# Patient Record
Sex: FEMALE | Race: WHITE | NOT HISPANIC OR LATINO | Employment: UNEMPLOYED | URBAN - METROPOLITAN AREA
[De-identification: names, ages, dates, MRNs, and addresses within clinical notes are randomized per-mention and may not be internally consistent; named-entity substitution may affect disease eponyms.]

---

## 2020-03-10 ENCOUNTER — HOSPITAL ENCOUNTER (EMERGENCY)
Facility: HOSPITAL | Age: 40
Discharge: HOME | End: 2020-03-10
Attending: EMERGENCY MEDICINE
Payer: MEDICAID

## 2020-03-10 VITALS
HEIGHT: 71 IN | BODY MASS INDEX: 29.68 KG/M2 | OXYGEN SATURATION: 100 % | WEIGHT: 212 LBS | SYSTOLIC BLOOD PRESSURE: 135 MMHG | HEART RATE: 88 BPM | TEMPERATURE: 98 F | DIASTOLIC BLOOD PRESSURE: 88 MMHG | RESPIRATION RATE: 18 BRPM

## 2020-03-10 DIAGNOSIS — L02.91 ABSCESS: Primary | ICD-10-CM

## 2020-03-10 LAB
ALBUMIN SERPL BCP-MCNC: 4 G/DL (ref 3.5–5.2)
ALP SERPL-CCNC: 67 U/L (ref 55–135)
ALT SERPL W/O P-5'-P-CCNC: 20 U/L (ref 10–44)
ANION GAP SERPL CALC-SCNC: 7 MMOL/L (ref 8–16)
AST SERPL-CCNC: 17 U/L (ref 10–40)
BASOPHILS # BLD AUTO: 0.1 K/UL (ref 0–0.2)
BASOPHILS NFR BLD: 0.6 % (ref 0–1.9)
BILIRUB SERPL-MCNC: 0.9 MG/DL (ref 0.1–1)
BUN SERPL-MCNC: 23 MG/DL (ref 6–20)
CALCIUM SERPL-MCNC: 9.1 MG/DL (ref 8.7–10.5)
CHLORIDE SERPL-SCNC: 104 MMOL/L (ref 95–110)
CO2 SERPL-SCNC: 23 MMOL/L (ref 23–29)
CREAT SERPL-MCNC: 0.8 MG/DL (ref 0.5–1.4)
DIFFERENTIAL METHOD: ABNORMAL
EOSINOPHIL # BLD AUTO: 0.1 K/UL (ref 0–0.5)
EOSINOPHIL NFR BLD: 0.8 % (ref 0–8)
ERYTHROCYTE [DISTWIDTH] IN BLOOD BY AUTOMATED COUNT: 14.9 % (ref 11.5–14.5)
EST. GFR  (AFRICAN AMERICAN): >60 ML/MIN/1.73 M^2
EST. GFR  (NON AFRICAN AMERICAN): >60 ML/MIN/1.73 M^2
GLUCOSE SERPL-MCNC: 95 MG/DL (ref 70–110)
HCT VFR BLD AUTO: 37.1 % (ref 37–48.5)
HGB BLD-MCNC: 11.6 G/DL (ref 12–16)
IMM GRANULOCYTES # BLD AUTO: 0.12 K/UL (ref 0–0.04)
IMM GRANULOCYTES NFR BLD AUTO: 0.7 % (ref 0–0.5)
LYMPHOCYTES # BLD AUTO: 2.1 K/UL (ref 1–4.8)
LYMPHOCYTES NFR BLD: 12.9 % (ref 18–48)
MCH RBC QN AUTO: 30.1 PG (ref 27–31)
MCHC RBC AUTO-ENTMCNC: 31.3 G/DL (ref 32–36)
MCV RBC AUTO: 96 FL (ref 82–98)
MONOCYTES # BLD AUTO: 1.9 K/UL (ref 0.3–1)
MONOCYTES NFR BLD: 11.3 % (ref 4–15)
NEUTROPHILS # BLD AUTO: 12.2 K/UL (ref 1.8–7.7)
NEUTROPHILS NFR BLD: 73.7 % (ref 38–73)
NRBC BLD-RTO: 0 /100 WBC
PLATELET # BLD AUTO: 363 K/UL (ref 150–350)
PMV BLD AUTO: 11.3 FL (ref 9.2–12.9)
POTASSIUM SERPL-SCNC: 3.4 MMOL/L (ref 3.5–5.1)
PROT SERPL-MCNC: 7.9 G/DL (ref 6–8.4)
RBC # BLD AUTO: 3.86 M/UL (ref 4–5.4)
SODIUM SERPL-SCNC: 134 MMOL/L (ref 136–145)
WBC # BLD AUTO: 16.55 K/UL (ref 3.9–12.7)

## 2020-03-10 PROCEDURE — 99284 EMERGENCY DEPT VISIT MOD MDM: CPT | Mod: 25

## 2020-03-10 PROCEDURE — 96365 THER/PROPH/DIAG IV INF INIT: CPT

## 2020-03-10 PROCEDURE — S0077 INJECTION, CLINDAMYCIN PHOSP: HCPCS | Performed by: NURSE PRACTITIONER

## 2020-03-10 PROCEDURE — 80053 COMPREHEN METABOLIC PANEL: CPT

## 2020-03-10 PROCEDURE — 25000003 PHARM REV CODE 250: Performed by: NURSE PRACTITIONER

## 2020-03-10 PROCEDURE — 96361 HYDRATE IV INFUSION ADD-ON: CPT | Mod: 59

## 2020-03-10 PROCEDURE — 63600175 PHARM REV CODE 636 W HCPCS: Performed by: NURSE PRACTITIONER

## 2020-03-10 PROCEDURE — 85025 COMPLETE CBC W/AUTO DIFF WBC: CPT

## 2020-03-10 PROCEDURE — 10060 I&D ABSCESS SIMPLE/SINGLE: CPT | Mod: RT

## 2020-03-10 PROCEDURE — 36415 COLL VENOUS BLD VENIPUNCTURE: CPT

## 2020-03-10 PROCEDURE — 96366 THER/PROPH/DIAG IV INF ADDON: CPT

## 2020-03-10 RX ORDER — POTASSIUM CHLORIDE 20 MEQ/1
40 TABLET, EXTENDED RELEASE ORAL
Status: COMPLETED | OUTPATIENT
Start: 2020-03-10 | End: 2020-03-10

## 2020-03-10 RX ORDER — LIDOCAINE HYDROCHLORIDE 10 MG/ML
10 INJECTION, SOLUTION EPIDURAL; INFILTRATION; INTRACAUDAL; PERINEURAL
Status: COMPLETED | OUTPATIENT
Start: 2020-03-10 | End: 2020-03-10

## 2020-03-10 RX ORDER — CLINDAMYCIN PHOSPHATE 900 MG/50ML
900 INJECTION, SOLUTION INTRAVENOUS
Status: COMPLETED | OUTPATIENT
Start: 2020-03-10 | End: 2020-03-10

## 2020-03-10 RX ORDER — DICLOFENAC SODIUM 50 MG/1
50 TABLET, DELAYED RELEASE ORAL 3 TIMES DAILY PRN
Qty: 20 TABLET | Refills: 2 | Status: ON HOLD | OUTPATIENT
Start: 2020-03-10 | End: 2024-02-28 | Stop reason: HOSPADM

## 2020-03-10 RX ORDER — CLINDAMYCIN HYDROCHLORIDE 300 MG/1
300 CAPSULE ORAL 4 TIMES DAILY
Qty: 40 CAPSULE | Refills: 0 | Status: SHIPPED | OUTPATIENT
Start: 2020-03-10 | End: 2020-03-20

## 2020-03-10 RX ADMIN — CLINDAMYCIN PHOSPHATE 900 MG: 900 INJECTION, SOLUTION INTRAVENOUS at 05:03

## 2020-03-10 RX ADMIN — POTASSIUM CHLORIDE 40 MEQ: 20 TABLET, EXTENDED RELEASE ORAL at 07:03

## 2020-03-10 RX ADMIN — LIDOCAINE HYDROCHLORIDE 100 MG: 10 INJECTION, SOLUTION EPIDURAL; INFILTRATION; INTRACAUDAL; PERINEURAL at 05:03

## 2020-03-10 RX ADMIN — SODIUM CHLORIDE 1000 ML: 9 INJECTION, SOLUTION INTRAVENOUS at 05:03

## 2020-03-10 NOTE — ED PROVIDER NOTES
Encounter Date: 3/10/2020       History     Chief Complaint   Patient presents with    Abscess     RT FA, REDNESS, SENT FROM URGENT CARE, ONSET SAT, NEG SCREEN     Presents with abscess with surrounding cellulitis to right FA  Onset Friday         Review of patient's allergies indicates:  No Known Allergies  No past medical history on file.  No past surgical history on file.  No family history on file.  Social History     Tobacco Use    Smoking status: Not on file   Substance Use Topics    Alcohol use: Not on file    Drug use: Not on file     Review of Systems   Constitutional: Negative for fever.   Respiratory: Negative for cough, shortness of breath and wheezing.    Cardiovascular: Negative for chest pain, palpitations and leg swelling.   Gastrointestinal: Negative for abdominal pain, diarrhea, nausea and vomiting.   Genitourinary: Negative for dysuria.   Musculoskeletal: Negative for back pain.   Skin: Negative for rash.        abscess   Neurological: Negative for weakness.       Physical Exam     Initial Vitals [03/10/20 1432]   BP Pulse Resp Temp SpO2   (!) 154/72 99 19 98.9 °F (37.2 °C) 98 %      MAP       --         Physical Exam    Constitutional: She appears well-developed and well-nourished.   HENT:   Head: Normocephalic and atraumatic.   Eyes: Conjunctivae are normal.   Neck: Normal range of motion. Neck supple.   Cardiovascular: Normal rate and regular rhythm.   Pulmonary/Chest: Breath sounds normal. No respiratory distress.   Musculoskeletal: Normal range of motion.   Neurological: She is alert and oriented to person, place, and time. No sensory deficit. GCS score is 15. GCS eye subscore is 4. GCS verbal subscore is 5. GCS motor subscore is 6.   Skin: Skin is warm and dry.   Abscess to right FA with surrounding cellulitis   Psychiatric: She has a normal mood and affect. Thought content normal.         ED Course   I & D - Incision and Drainage  Date/Time: 3/10/2020 6:44 PM  Performed by: Raisa RUSSO  Back, NP  Authorized by: Sriram James MD   Consent Done: Not Needed  Type: abscess  Body area: upper extremity  Location details: right arm  Anesthesia: local infiltration    Anesthesia:  Local Anesthetic: lidocaine 1% without epinephrine  Anesthetic total: 10 mL  Patient sedated: no  Scalpel size: 11  Incision type: single straight  Complexity: simple  Drainage: serosanguinous  Drainage amount: scant  Wound treatment: incision  Packing material: 1/4 in gauze  Complications: No  Specimens: No  Implants: No  Patient tolerance: Patient tolerated the procedure well with no immediate complications        Labs Reviewed   CBC W/ AUTO DIFFERENTIAL - Abnormal; Notable for the following components:       Result Value    WBC 16.55 (*)     RBC 3.86 (*)     Hemoglobin 11.6 (*)     Mean Corpuscular Hemoglobin Conc 31.3 (*)     RDW 14.9 (*)     Platelets 363 (*)     Immature Granulocytes 0.7 (*)     Gran # (ANC) 12.2 (*)     Immature Grans (Abs) 0.12 (*)     Mono # 1.9 (*)     Gran% 73.7 (*)     Lymph% 12.9 (*)     All other components within normal limits   COMPREHENSIVE METABOLIC PANEL - Abnormal; Notable for the following components:    Sodium 134 (*)     Potassium 3.4 (*)     BUN, Bld 23 (*)     Anion Gap 7 (*)     All other components within normal limits          Imaging Results    None                       Attending Attestation:     Physician Attestation Statement for NP/PA:   I discussed this assessment and plan of this patient with the NP/PA, but I did not personally examine the patient. The face to face encounter was performed by the NP/PA.    Other NP/PA Attestation Additions:    History of Present Illness: I was not called upon to see this patient but was available for consultation and agree with the patient's disposition and management as it was presented to me by the APC.                                   Clinical Impression:       ICD-10-CM ICD-9-CM   1. Abscess L02.91 682.9                                 Raisa Back NP  03/10/20 6748       Sriram James MD  03/10/20 7744

## 2020-03-12 ENCOUNTER — HOSPITAL ENCOUNTER (EMERGENCY)
Facility: HOSPITAL | Age: 40
Discharge: HOME | End: 2020-03-13
Attending: EMERGENCY MEDICINE

## 2020-03-12 DIAGNOSIS — L02.91 ABSCESS: ICD-10-CM

## 2020-03-12 DIAGNOSIS — Z76.89 ENCOUNTER FOR INCISION AND DRAINAGE PROCEDURE: Primary | ICD-10-CM

## 2020-03-12 PROCEDURE — 99284 EMERGENCY DEPT VISIT MOD MDM: CPT

## 2020-03-12 RX ORDER — BUPROPION HYDROCHLORIDE 150 MG/1
150 TABLET ORAL DAILY
COMMUNITY

## 2020-03-12 RX ORDER — PRAMIPEXOLE DIHYDROCHLORIDE 0.25 MG/1
0.5 TABLET ORAL NIGHTLY
COMMUNITY

## 2020-03-12 RX ORDER — SERTRALINE HYDROCHLORIDE 100 MG/1
100 TABLET, FILM COATED ORAL DAILY
COMMUNITY

## 2020-03-12 RX ORDER — BENAZEPRIL HYDROCHLORIDE AND HYDROCHLOROTHIAZIDE 20; 12.5 MG/1; MG/1
1 TABLET ORAL DAILY
COMMUNITY

## 2020-03-13 VITALS
HEART RATE: 89 BPM | SYSTOLIC BLOOD PRESSURE: 144 MMHG | RESPIRATION RATE: 18 BRPM | HEIGHT: 72 IN | OXYGEN SATURATION: 98 % | TEMPERATURE: 99 F | BODY MASS INDEX: 29.66 KG/M2 | WEIGHT: 219 LBS | DIASTOLIC BLOOD PRESSURE: 82 MMHG

## 2020-03-13 PROCEDURE — 63600175 PHARM REV CODE 636 W HCPCS: Mod: SL | Performed by: STUDENT IN AN ORGANIZED HEALTH CARE EDUCATION/TRAINING PROGRAM

## 2020-03-13 PROCEDURE — 90714 TD VACC NO PRESV 7 YRS+ IM: CPT | Mod: SL | Performed by: STUDENT IN AN ORGANIZED HEALTH CARE EDUCATION/TRAINING PROGRAM

## 2020-03-13 PROCEDURE — 90471 IMMUNIZATION ADMIN: CPT | Mod: VFC | Performed by: STUDENT IN AN ORGANIZED HEALTH CARE EDUCATION/TRAINING PROGRAM

## 2020-03-13 PROCEDURE — 10060 I&D ABSCESS SIMPLE/SINGLE: CPT | Mod: XS

## 2020-03-13 PROCEDURE — 25000003 PHARM REV CODE 250: Performed by: EMERGENCY MEDICINE

## 2020-03-13 RX ORDER — LIDOCAINE AND PRILOCAINE 25; 25 MG/G; MG/G
CREAM TOPICAL ONCE
Status: COMPLETED | OUTPATIENT
Start: 2020-03-13 | End: 2020-03-13

## 2020-03-13 RX ORDER — DOXYCYCLINE 100 MG/1
100 CAPSULE ORAL 2 TIMES DAILY
Qty: 20 CAPSULE | Refills: 0 | Status: SHIPPED | OUTPATIENT
Start: 2020-03-13 | End: 2020-03-23

## 2020-03-13 RX ORDER — LIDOCAINE HYDROCHLORIDE AND EPINEPHRINE 10; 10 MG/ML; UG/ML
20 INJECTION, SOLUTION INFILTRATION; PERINEURAL ONCE
Status: COMPLETED | OUTPATIENT
Start: 2020-03-13 | End: 2020-03-13

## 2020-03-13 RX ADMIN — TETANUS AND DIPHTHERIA TOXOIDS ADSORBED 0.5 ML: 2; 2 INJECTION INTRAMUSCULAR at 03:03

## 2020-03-13 RX ADMIN — LIDOCAINE AND PRILOCAINE: 25; 25 CREAM TOPICAL at 02:03

## 2020-03-13 RX ADMIN — LIDOCAINE HYDROCHLORIDE AND EPINEPHRINE 20 ML: 10; 10 INJECTION, SOLUTION INFILTRATION; PERINEURAL at 02:03

## 2020-03-13 NOTE — ED PROVIDER NOTES
Encounter Date: 3/12/2020       History     Chief Complaint   Patient presents with    Abscess     c/o two abscesses to left buttock, began as small pimples 1 week ago, but she  picked and  squeezed them     HPI   Ms. Gil is a 40-year-old female with a past medical history of hypertension, polysubstance use, presenting to the emergency department for evaluation of an abscess to the right forearm, as well as left buttock, patient states that she recently had an incision and drainage to the right forearm 2 days ago, however appears to have reaccumulated, patient states the buttock abscess has been ongoing for the past several days.  Patient denies nausea vomiting, fever chills, shortness of breath or abdominal pain, or other infectious symptoms.  Patient states her last tetanus shot was in 2010.  Denies recent drug use in the upper extremity or buttock, states she has not used injectable drugs in over a year.    Review of patient's allergies indicates:  No Known Allergies  Past Medical History:   Diagnosis Date    Anxiety     Depression     Hypertension      Past Surgical History:   Procedure Laterality Date    CHOLECYSTECTOMY      TUBAL LIGATION      TYMPANOSTOMY TUBE PLACEMENT       History reviewed. No pertinent family history.  Social History     Tobacco Use    Smoking status: Never Smoker   Substance Use Topics    Alcohol use: Not on file    Drug use: Not on file     Review of Systems   Constitutional: Negative for chills, diaphoresis, fatigue and fever.   HENT: Negative for congestion.    Eyes: Negative for visual disturbance.   Respiratory: Negative for cough, shortness of breath, wheezing and stridor.    Cardiovascular: Negative for chest pain and palpitations.   Gastrointestinal: Negative for abdominal distention, diarrhea, nausea and vomiting.   Genitourinary: Negative for dysuria, frequency, hematuria and urgency.   Musculoskeletal: Negative for back pain.   Skin: Positive for wound. Negative  for pallor.   Neurological: Negative for weakness, light-headedness, numbness and headaches.   Psychiatric/Behavioral: Negative for confusion.   All other systems reviewed and are negative.      Physical Exam     Initial Vitals [20]   BP Pulse Resp Temp SpO2   (!) 157/80 108 20 98.8 °F (37.1 °C) 98 %      MAP       --         Physical Exam    Nursing note and vitals reviewed.  Constitutional: She appears well-developed and well-nourished. No distress.   HENT:   Head: Normocephalic and atraumatic.   Eyes: EOM are normal.   Neck: Normal range of motion. Neck supple.   Cardiovascular: Normal rate, regular rhythm, normal heart sounds and intact distal pulses.   Pulmonary/Chest: Breath sounds normal. She has no wheezes. She has no rhonchi. She has no rales.   Abdominal: Soft. Bowel sounds are normal. She exhibits no distension. There is no tenderness. There is no rebound.   Musculoskeletal: Normal range of motion.   Neurological: She is alert and oriented to person, place, and time. She has normal strength. No sensory deficit.   Skin: Skin is warm and dry. Capillary refill takes less than 2 seconds. Abscess (1 cm draining abscess to right forearm with mild amount of surrounding erythema, diffuse folliculitis to bilateral buttocks, 2 cm abscess to left buttocks with moderate surrounding cellulitis) noted.   Psychiatric: Thought content normal.         ED Course   I & D - Incision and Drainage  Date/Time: 3/13/2020 3:01 AM  Location procedure was performed: [facility] EMERGENCY DEPARTMENT  Performed by: Yoan Bermudez MD  Authorized by: Luzma Taylor MD   Pre-operative diagnosis: abscess  Post-operative diagnosis: abscess  Consent Done: Yes  Consent: Verbal consent obtained.  Risks and benefits: risks, benefits and alternatives were discussed  Consent given by: patient  Site marked: the operative site was marked  Patient identity confirmed: , name and verbally with patient  Time out:  "Immediately prior to procedure a "time out" was called to verify the correct patient, procedure, equipment, support staff and site/side marked as required.  Type: abscess  Body area: upper extremity  Location details: right arm  Anesthesia: local infiltration    Anesthesia:  Local anesthesia used: yes  Local Anesthetic: lidocaine 1% with epinephrine  Anesthetic total: 5 mL  Patient sedated: no  Description of findings: abscess   Scalpel size: 11  Incision type: single straight  Complexity: simple  Drainage: pus  Drainage amount: scant  Wound treatment: incision,  deloculation,  drainage,  expression of material and  wound packed  Packing material: / in gauze  Technical procedures used: incision  Significant surgical tasks conducted by the assistant(s):    Complications: No  Estimated blood loss (mL): 1  Specimens: No  Implants: No  Patient tolerance: Patient tolerated the procedure well with no immediate complications    I & D - Incision and Drainage  Date/Time: 3/13/2020 3:03 AM  Location procedure was performed: [facility] EMERGENCY DEPARTMENT  Performed by: Yoan Bermudez MD  Authorized by: Luzma Taylor MD   Pre-operative diagnosis: abscess  Post-operative diagnosis: abscess  Consent Done: Yes  Consent: Verbal consent obtained.  Consent given by: patient  Site marked: the operative site was marked  Patient identity confirmed: , name and verbally with patient  Time out: Immediately prior to procedure a "time out" was called to verify the correct patient, procedure, equipment, support staff and site/side marked as required.  Type: abscess  Location: buttock.  Anesthesia: local infiltration    Anesthesia:  Local anesthesia used: yes  Local Anesthetic: lidocaine 1% with epinephrine  Anesthetic total: 5 mL  Patient sedated: no  Description of findings: abscess   Scalpel size: 11  Incision type: single straight  Complexity: simple  Drainage: pus  Drainage amount: moderate  Wound treatment: " incision,  drainage,  expression of material,  wound packed and  deloculation  Packing material: 1/4 in gauze  Technical procedures used: incision  Significant surgical tasks conducted by the assistant(s):    Complications: No  Specimens: No  Patient tolerance: Patient tolerated the procedure well with no immediate complications        HO4  40-year-old female with a history of substance use, presenting to the emergency department for evaluation of an abscess to her right forearm does drink 2 days ago in reaccumulated, as well as an abscess to the left buttock, has been going on for several days, worsening, denies fever, chills, nausea, vomiting, or other systemic symptoms.  Patient has been taking clindamycin over the last 2 days following her incision and drainage, states that it is not improved the abscess to the buttock.  On physical exam as above.  A large abscess to the buttock on the left, as well as her right forearm, fluctuance noted, slight surrounding area of erythema, patient is afebrile with stable vitals here and not septic or ill appearing.  Incision drainage performed as above, tolerated well with no complications with moderate purulence of discharge, packed with iodoform gauze, patient given the instructions for cleaning or wounds, as well as removing the packing, patient to be switched from clindamycin to doxycycline, given tetanus here in the emergency department.  Patient is currently stable for discharge, will follow up, will return to the emergency department number concerning symptoms develop.  Yona Bermudez MD PGY4  03/13/2020 3:15 AM      Attending:  I saw and examined the patient.  I have reviewed and agree with the resident's findings, including all diagnostic interpretations and plans as written.  I was present for the key portions of the separately billed procedures.    Luzma Taylor MD  Emergency Medicine  03/13/2020 4:56 AM      Labs Reviewed   CULTURE, AEROBIC  (SPECIFY  SOURCE)          Imaging Results    None                                          Clinical Impression:       ICD-10-CM ICD-9-CM   1. Encounter for incision and drainage procedure Z01.89 V72.85   2. Abscess L02.91 682.9                                Yoan Bermudez MD  Resident  03/13/20 0318       Luzma Taylor MD  03/13/20 0402

## 2020-03-13 NOTE — ED NOTES
I/D done per ERP to both right forearm and left buttock. Link well. Packing and dressing placed by MD.

## 2020-04-09 ENCOUNTER — HOSPITAL ENCOUNTER (EMERGENCY)
Facility: HOSPITAL | Age: 40
Discharge: HOME | End: 2020-04-09
Attending: EMERGENCY MEDICINE
Payer: MEDICAID

## 2020-04-09 VITALS
SYSTOLIC BLOOD PRESSURE: 122 MMHG | WEIGHT: 216 LBS | DIASTOLIC BLOOD PRESSURE: 78 MMHG | TEMPERATURE: 98 F | HEIGHT: 71 IN | HEART RATE: 75 BPM | OXYGEN SATURATION: 99 % | RESPIRATION RATE: 16 BRPM | BODY MASS INDEX: 30.24 KG/M2

## 2020-04-09 DIAGNOSIS — S50.12XA CONTUSION OF LEFT FOREARM, INITIAL ENCOUNTER: Primary | ICD-10-CM

## 2020-04-09 DIAGNOSIS — S60.00XA CONTUSION OF FINGER WITHOUT DAMAGE TO NAIL, UNSPECIFIED FINGER, INITIAL ENCOUNTER: ICD-10-CM

## 2020-04-09 DIAGNOSIS — R52 PAIN: ICD-10-CM

## 2020-04-09 LAB
B-HCG UR QL: NEGATIVE
CTP QC/QA: YES

## 2020-04-09 PROCEDURE — 99284 EMERGENCY DEPT VISIT MOD MDM: CPT | Mod: 25

## 2020-04-09 PROCEDURE — 81025 URINE PREGNANCY TEST: CPT | Performed by: NURSE PRACTITIONER

## 2020-04-09 PROCEDURE — 25000003 PHARM REV CODE 250: Performed by: EMERGENCY MEDICINE

## 2020-04-09 RX ORDER — ACETAMINOPHEN 325 MG/1
650 TABLET ORAL
Status: COMPLETED | OUTPATIENT
Start: 2020-04-09 | End: 2020-04-09

## 2020-04-09 RX ORDER — DEXTROMETHORPHAN HYDROBROMIDE, GUAIFENESIN 5; 100 MG/5ML; MG/5ML
650 LIQUID ORAL EVERY 8 HOURS
Qty: 40 TABLET | Refills: 0 | Status: SHIPPED | OUTPATIENT
Start: 2020-04-09

## 2020-04-09 RX ADMIN — ACETAMINOPHEN 650 MG: 325 TABLET ORAL at 06:04
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
## 2020-04-10 NOTE — DISCHARGE INSTRUCTIONS
Please take your medications as prescribed.  If your symptoms worsen despite treatment please report back to the ER for repeat evaluation.  Your initial read of your x-rays did not show any obvious fractures.
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
## 2020-07-10 ENCOUNTER — HOSPITAL ENCOUNTER (INPATIENT)
Facility: HOSPITAL | Age: 40
LOS: 3 days | Discharge: HOME | DRG: 603 | End: 2020-07-13
Attending: EMERGENCY MEDICINE | Admitting: INTERNAL MEDICINE
Payer: MEDICAID

## 2020-07-10 DIAGNOSIS — R50.9 FEVER: ICD-10-CM

## 2020-07-10 DIAGNOSIS — L03.818 CELLULITIS OF OTHER SPECIFIED SITE: Primary | ICD-10-CM

## 2020-07-10 PROBLEM — L03.90 CELLULITIS: Status: ACTIVE | Noted: 2020-07-10

## 2020-07-10 PROBLEM — F19.90 IVDU (INTRAVENOUS DRUG USER): Status: ACTIVE | Noted: 2020-07-10

## 2020-07-10 PROBLEM — N39.0 UTI (URINARY TRACT INFECTION): Status: ACTIVE | Noted: 2020-07-10

## 2020-07-10 PROBLEM — D72.829 LEUKOCYTOSIS: Status: ACTIVE | Noted: 2020-07-10

## 2020-07-10 PROBLEM — F32.A DEPRESSION: Status: ACTIVE | Noted: 2020-07-10

## 2020-07-10 LAB
ALBUMIN SERPL BCP-MCNC: 3.6 G/DL (ref 3.5–5.2)
ALP SERPL-CCNC: 70 U/L (ref 55–135)
ALT SERPL W/O P-5'-P-CCNC: 18 U/L (ref 10–44)
ANION GAP SERPL CALC-SCNC: 9 MMOL/L (ref 8–16)
AST SERPL-CCNC: 15 U/L (ref 10–40)
B-HCG UR QL: NEGATIVE
BACTERIA #/AREA URNS HPF: ABNORMAL /HPF
BASOPHILS # BLD AUTO: 0.02 K/UL (ref 0–0.2)
BASOPHILS NFR BLD: 0.1 % (ref 0–1.9)
BILIRUB SERPL-MCNC: 0.6 MG/DL (ref 0.1–1)
BILIRUB UR QL STRIP: NEGATIVE
BUN SERPL-MCNC: 17 MG/DL (ref 6–20)
CALCIUM SERPL-MCNC: 9.1 MG/DL (ref 8.7–10.5)
CHLORIDE SERPL-SCNC: 101 MMOL/L (ref 95–110)
CLARITY UR: CLEAR
CO2 SERPL-SCNC: 24 MMOL/L (ref 23–29)
COLOR UR: YELLOW
CREAT SERPL-MCNC: 0.8 MG/DL (ref 0.5–1.4)
CRP SERPL-MCNC: 13.47 MG/DL
CTP QC/QA: YES
DIFFERENTIAL METHOD: ABNORMAL
EOSINOPHIL # BLD AUTO: 0.5 K/UL (ref 0–0.5)
EOSINOPHIL NFR BLD: 3.2 % (ref 0–8)
ERYTHROCYTE [DISTWIDTH] IN BLOOD BY AUTOMATED COUNT: 13.2 % (ref 11.5–14.5)
ERYTHROCYTE [SEDIMENTATION RATE] IN BLOOD BY WESTERGREN METHOD: 51 MM/HR (ref 0–20)
EST. GFR  (AFRICAN AMERICAN): >60 ML/MIN/1.73 M^2
EST. GFR  (NON AFRICAN AMERICAN): >60 ML/MIN/1.73 M^2
ESTIMATED AVG GLUCOSE: 111 MG/DL (ref 68–131)
FERRITIN SERPL-MCNC: 121 NG/ML (ref 20–300)
GLUCOSE SERPL-MCNC: 94 MG/DL (ref 70–110)
GLUCOSE UR QL STRIP: ABNORMAL
HBA1C MFR BLD HPLC: 5.5 % (ref 4.5–6.2)
HCT VFR BLD AUTO: 38.1 % (ref 37–48.5)
HGB BLD-MCNC: 12.4 G/DL (ref 12–16)
HGB UR QL STRIP: NEGATIVE
HYALINE CASTS #/AREA URNS LPF: 21 /LPF
IMM GRANULOCYTES # BLD AUTO: 0.08 K/UL (ref 0–0.04)
IMM GRANULOCYTES NFR BLD AUTO: 0.6 % (ref 0–0.5)
IRON SERPL-MCNC: 16 UG/DL (ref 30–160)
KETONES UR QL STRIP: ABNORMAL
LACTATE SERPL-SCNC: 1.1 MMOL/L (ref 0.5–1.9)
LEUKOCYTE ESTERASE UR QL STRIP: ABNORMAL
LYMPHOCYTES # BLD AUTO: 1 K/UL (ref 1–4.8)
LYMPHOCYTES NFR BLD: 6.9 % (ref 18–48)
MCH RBC QN AUTO: 30 PG (ref 27–31)
MCHC RBC AUTO-ENTMCNC: 32.5 G/DL (ref 32–36)
MCV RBC AUTO: 92 FL (ref 82–98)
MICROSCOPIC COMMENT: ABNORMAL
MONOCYTES # BLD AUTO: 0.9 K/UL (ref 0.3–1)
MONOCYTES NFR BLD: 5.9 % (ref 4–15)
NEUTROPHILS # BLD AUTO: 11.9 K/UL (ref 1.8–7.7)
NEUTROPHILS NFR BLD: 83.3 % (ref 38–73)
NITRITE UR QL STRIP: NEGATIVE
NRBC BLD-RTO: 0 /100 WBC
PH UR STRIP: 8 [PH] (ref 5–8)
PLATELET # BLD AUTO: 285 K/UL (ref 150–350)
PMV BLD AUTO: 11.7 FL (ref 9.2–12.9)
POTASSIUM SERPL-SCNC: 3.7 MMOL/L (ref 3.5–5.1)
PROCALCITONIN SERPL IA-MCNC: 0.17 NG/ML (ref 0–0.5)
PROT SERPL-MCNC: 7.4 G/DL (ref 6–8.4)
PROT UR QL STRIP: ABNORMAL
RBC # BLD AUTO: 4.13 M/UL (ref 4–5.4)
RBC #/AREA URNS HPF: 1 /HPF (ref 0–4)
SARS-COV-2 RDRP RESP QL NAA+PROBE: NEGATIVE
SATURATED IRON: 6 % (ref 20–50)
SODIUM SERPL-SCNC: 134 MMOL/L (ref 136–145)
SP GR UR STRIP: 1.03 (ref 1–1.03)
SQUAMOUS #/AREA URNS HPF: 12 /HPF
TOTAL IRON BINDING CAPACITY: 277 UG/DL (ref 250–450)
TRANSFERRIN SERPL-MCNC: 198 MG/DL (ref 200–375)
URN SPEC COLLECT METH UR: ABNORMAL
UROBILINOGEN UR STRIP-ACNC: NEGATIVE EU/DL
WBC # BLD AUTO: 14.32 K/UL (ref 3.9–12.7)
WBC #/AREA URNS HPF: 6 /HPF (ref 0–5)

## 2020-07-10 PROCEDURE — 81025 URINE PREGNANCY TEST: CPT | Performed by: EMERGENCY MEDICINE

## 2020-07-10 PROCEDURE — 63600175 PHARM REV CODE 636 W HCPCS: Performed by: EMERGENCY MEDICINE

## 2020-07-10 PROCEDURE — G0378 HOSPITAL OBSERVATION PER HR: HCPCS

## 2020-07-10 PROCEDURE — 25000003 PHARM REV CODE 250: Performed by: INTERNAL MEDICINE

## 2020-07-10 PROCEDURE — 80053 COMPREHEN METABOLIC PANEL: CPT

## 2020-07-10 PROCEDURE — 99900035 HC TECH TIME PER 15 MIN (STAT)

## 2020-07-10 PROCEDURE — 36415 COLL VENOUS BLD VENIPUNCTURE: CPT

## 2020-07-10 PROCEDURE — 85025 COMPLETE CBC W/AUTO DIFF WBC: CPT

## 2020-07-10 PROCEDURE — 25000003 PHARM REV CODE 250: Performed by: EMERGENCY MEDICINE

## 2020-07-10 PROCEDURE — 99285 EMERGENCY DEPT VISIT HI MDM: CPT | Mod: 25

## 2020-07-10 PROCEDURE — 82728 ASSAY OF FERRITIN: CPT

## 2020-07-10 PROCEDURE — 83540 ASSAY OF IRON: CPT

## 2020-07-10 PROCEDURE — 86140 C-REACTIVE PROTEIN: CPT

## 2020-07-10 PROCEDURE — 83605 ASSAY OF LACTIC ACID: CPT

## 2020-07-10 PROCEDURE — 83036 HEMOGLOBIN GLYCOSYLATED A1C: CPT

## 2020-07-10 PROCEDURE — 84145 PROCALCITONIN (PCT): CPT

## 2020-07-10 PROCEDURE — 87040 BLOOD CULTURE FOR BACTERIA: CPT

## 2020-07-10 PROCEDURE — 81001 URINALYSIS AUTO W/SCOPE: CPT

## 2020-07-10 PROCEDURE — 12000002 HC ACUTE/MED SURGE SEMI-PRIVATE ROOM

## 2020-07-10 PROCEDURE — 63600175 PHARM REV CODE 636 W HCPCS: Performed by: INTERNAL MEDICINE

## 2020-07-10 PROCEDURE — 96365 THER/PROPH/DIAG IV INF INIT: CPT

## 2020-07-10 PROCEDURE — 25000003 PHARM REV CODE 250: Performed by: NURSE PRACTITIONER

## 2020-07-10 PROCEDURE — 85651 RBC SED RATE NONAUTOMATED: CPT

## 2020-07-10 PROCEDURE — U0002 COVID-19 LAB TEST NON-CDC: HCPCS

## 2020-07-10 RX ORDER — DIPHENHYDRAMINE HYDROCHLORIDE 50 MG/ML
25 INJECTION INTRAMUSCULAR; INTRAVENOUS EVERY 6 HOURS PRN
Status: DISCONTINUED | OUTPATIENT
Start: 2020-07-10 | End: 2020-07-13 | Stop reason: HOSPADM

## 2020-07-10 RX ORDER — PRAMIPEXOLE DIHYDROCHLORIDE 0.12 MG/1
0.5 TABLET ORAL NIGHTLY
Status: DISCONTINUED | OUTPATIENT
Start: 2020-07-10 | End: 2020-07-13 | Stop reason: HOSPADM

## 2020-07-10 RX ORDER — VANCOMYCIN HCL IN 5 % DEXTROSE 1G/250ML
1000 PLASTIC BAG, INJECTION (ML) INTRAVENOUS ONCE
Status: COMPLETED | OUTPATIENT
Start: 2020-07-10 | End: 2020-07-10

## 2020-07-10 RX ORDER — ACETAMINOPHEN 500 MG
1000 TABLET ORAL
Status: COMPLETED | OUTPATIENT
Start: 2020-07-10 | End: 2020-07-10

## 2020-07-10 RX ORDER — BUPROPION HYDROCHLORIDE 150 MG/1
150 TABLET ORAL DAILY
Status: DISCONTINUED | OUTPATIENT
Start: 2020-07-10 | End: 2020-07-13 | Stop reason: HOSPADM

## 2020-07-10 RX ORDER — HYDROCHLOROTHIAZIDE 12.5 MG/1
12.5 TABLET ORAL DAILY
Status: DISCONTINUED | OUTPATIENT
Start: 2020-07-10 | End: 2020-07-13 | Stop reason: HOSPADM

## 2020-07-10 RX ORDER — MORPHINE SULFATE 2 MG/ML
1 INJECTION, SOLUTION INTRAMUSCULAR; INTRAVENOUS EVERY 6 HOURS PRN
Status: DISCONTINUED | OUTPATIENT
Start: 2020-07-10 | End: 2020-07-13 | Stop reason: HOSPADM

## 2020-07-10 RX ORDER — BENAZEPRIL HYDROCHLORIDE 20 MG/1
20 TABLET ORAL DAILY
Status: DISCONTINUED | OUTPATIENT
Start: 2020-07-10 | End: 2020-07-13 | Stop reason: HOSPADM

## 2020-07-10 RX ORDER — SODIUM CHLORIDE 9 MG/ML
INJECTION, SOLUTION INTRAVENOUS CONTINUOUS
Status: DISCONTINUED | OUTPATIENT
Start: 2020-07-10 | End: 2020-07-11

## 2020-07-10 RX ORDER — BENAZEPRIL HYDROCHLORIDE AND HYDROCHLOROTHIAZIDE 20; 12.5 MG/1; MG/1
1 TABLET ORAL DAILY
Status: DISCONTINUED | OUTPATIENT
Start: 2020-07-10 | End: 2020-07-10

## 2020-07-10 RX ORDER — CLINDAMYCIN PHOSPHATE 900 MG/50ML
900 INJECTION, SOLUTION INTRAVENOUS
Status: DISCONTINUED | OUTPATIENT
Start: 2020-07-10 | End: 2020-07-13 | Stop reason: HOSPADM

## 2020-07-10 RX ORDER — SERTRALINE HYDROCHLORIDE 50 MG/1
100 TABLET, FILM COATED ORAL DAILY
Status: DISCONTINUED | OUTPATIENT
Start: 2020-07-10 | End: 2020-07-13 | Stop reason: HOSPADM

## 2020-07-10 RX ORDER — SODIUM CHLORIDE 0.9 % (FLUSH) 0.9 %
10 SYRINGE (ML) INJECTION
Status: DISCONTINUED | OUTPATIENT
Start: 2020-07-10 | End: 2020-07-13 | Stop reason: HOSPADM

## 2020-07-10 RX ORDER — ACETAMINOPHEN 325 MG/1
650 TABLET ORAL EVERY 8 HOURS PRN
Status: DISCONTINUED | OUTPATIENT
Start: 2020-07-10 | End: 2020-07-13 | Stop reason: HOSPADM

## 2020-07-10 RX ORDER — HYDROCODONE BITARTRATE AND ACETAMINOPHEN 5; 325 MG/1; MG/1
1 TABLET ORAL EVERY 6 HOURS PRN
Status: DISCONTINUED | OUTPATIENT
Start: 2020-07-10 | End: 2020-07-13 | Stop reason: HOSPADM

## 2020-07-10 RX ADMIN — CLINDAMYCIN IN 5 PERCENT DEXTROSE 900 MG: 18 INJECTION, SOLUTION INTRAVENOUS at 11:07

## 2020-07-10 RX ADMIN — SODIUM CHLORIDE: 0.9 INJECTION, SOLUTION INTRAVENOUS at 09:07

## 2020-07-10 RX ADMIN — SERTRALINE HYDROCHLORIDE 100 MG: 50 TABLET ORAL at 09:07

## 2020-07-10 RX ADMIN — CLINDAMYCIN IN 5 PERCENT DEXTROSE 900 MG: 18 INJECTION, SOLUTION INTRAVENOUS at 03:07

## 2020-07-10 RX ADMIN — HYDROCHLOROTHIAZIDE 12.5 MG: 12.5 TABLET ORAL at 09:07

## 2020-07-10 RX ADMIN — VANCOMYCIN HYDROCHLORIDE 1000 MG: 1 INJECTION, POWDER, LYOPHILIZED, FOR SOLUTION INTRAVENOUS at 03:07

## 2020-07-10 RX ADMIN — BENAZEPRIL HYDROCHLORIDE 20 MG: 20 TABLET, COATED ORAL at 09:07

## 2020-07-10 RX ADMIN — ACETAMINOPHEN 1000 MG: 500 TABLET, FILM COATED ORAL at 03:07

## 2020-07-10 RX ADMIN — VANCOMYCIN HYDROCHLORIDE 500 MG: 500 INJECTION, POWDER, LYOPHILIZED, FOR SOLUTION INTRAVENOUS at 03:07

## 2020-07-10 RX ADMIN — PRAMIPEXOLE DIHYDROCHLORIDE 0.5 MG: 0.12 TABLET ORAL at 08:07

## 2020-07-10 RX ADMIN — SODIUM CHLORIDE, SODIUM LACTATE, POTASSIUM CHLORIDE, AND CALCIUM CHLORIDE 1000 ML: .6; .31; .03; .02 INJECTION, SOLUTION INTRAVENOUS at 02:07

## 2020-07-10 RX ADMIN — CEFTRIAXONE SODIUM 1 G: 1 INJECTION, POWDER, FOR SOLUTION INTRAMUSCULAR; INTRAVENOUS at 05:07

## 2020-07-10 RX ADMIN — BUPROPION HYDROCHLORIDE 150 MG: 150 TABLET, EXTENDED RELEASE ORAL at 09:07

## 2020-07-10 RX ADMIN — DIPHENHYDRAMINE HYDROCHLORIDE 25 MG: 50 INJECTION INTRAMUSCULAR; INTRAVENOUS at 11:07

## 2020-07-10 NOTE — PROGRESS NOTES
Pharmacokinetic Initial Assessment: IV Vancomycin    Assessment/Plan:    Initiate intravenous vancomycin with loading dose of 1500 mg once followed by a maintenance dose of vancomycin 1500 mg IV every 12 hours  Desired empiric serum trough concentration is 10 to 15 mcg/mL  Draw vancomycin trough level 30 min prior to fourth dose on 07/11 at approximately 1430  Pharmacy will continue to follow and monitor vancomycin.      Please contact pharmacy at extension [phone] with any questions regarding this assessment.     Thank you for the consult,   Harry Scott       Patient brief summary:  Hair Gil is a 40 y.o. female initiated on antimicrobial therapy with IV Vancomycin for treatment of suspected skin & soft tissue infection    Drug Allergies:   Review of patient's allergies indicates:  No Known Allergies    Actual Body Weight:   86.2 kg    Renal Function:   Estimated Creatinine Clearance: 111.4 mL/min (based on SCr of 0.8 mg/dL).,     CBC (last 72 hours):  Recent Labs   Lab Result Units 07/10/20  0223   WBC K/uL 14.32*   Hemoglobin g/dL 12.4   Hematocrit % 38.1   Platelets K/uL 285   Gran% % 83.3*   Lymph% % 6.9*   Mono% % 5.9   Eosinophil% % 3.2   Basophil% % 0.1   Differential Method  Automated       Metabolic Panel (last 72 hours):  Recent Labs   Lab Result Units 07/10/20  0223 07/10/20  0342   Sodium mmol/L 134*  --    Potassium mmol/L 3.7  --    Chloride mmol/L 101  --    CO2 mmol/L 24  --    Glucose mg/dL 94  --    Glucose, UA   --  Trace*   BUN, Bld mg/dL 17  --    Creatinine mg/dL 0.8  --    Albumin g/dL 3.6  --    Total Bilirubin mg/dL 0.6  --    Alkaline Phosphatase U/L 70  --    AST U/L 15  --    ALT U/L 18  --        Drug levels (last 3 results):  No results for input(s): VANCOMYCINRA, VANCOMYCINPE, VANCOMYCINTR in the last 72 hours.    Microbiologic Results:  Microbiology Results (last 7 days)       Procedure Component Value Units Date/Time    Blood culture #2 **CANNOT BE ORDERED STAT** [[id number]]  Collected: 07/10/20 0223    Order Status: Sent Specimen: Blood from Peripheral, Antecubital, Right Updated: 07/10/20 0234    Blood culture #1 **CANNOT BE ORDERED STAT** [[id number]] Collected: 07/10/20 0200    Order Status: Sent Specimen: Blood from Peripheral, Antecubital, Right Updated: 07/10/20 0233

## 2020-07-10 NOTE — PROGRESS NOTES
HOSPITALIST INTERVAL PROGRESS NOTE    The patient was admitted earlier today to the hospital medicine service for cellulitis requiring IV antibiotics.  Chart reviewed.  Case discussed with Nursing.  Patient was personally seen and examined.  She has cellulitis of mons pubis with extension into right labia majora with abscess status post I and D with minimal drainage.  She developed what appears to be red man syndrome from IV vancomycin with significant erythema of trunk and upper extremities including face and neck associated with pruritus that has improved some with IV Benadryl.  Will transition IV clindamycin.  She likely needs a couple more days of IV antibiotics prior to transition to oral antibiotics and discharge home.  No need for infectious disease consultation; I discussed this with Dr. Jiménez.  Continue pain control and antiemetics as needed.  Repeat a.m. labs.    Scott Hernández MD  [facility] Hospitalist

## 2020-07-10 NOTE — PLAN OF CARE
07/10/20 1603   Discharge Assessment   Assessment Type Discharge Planning Assessment   Confirmed/corrected address and phone number on facesheet? Yes   Assessment information obtained from? Patient   Expected Length of Stay (days) 3   Communicated expected length of stay with patient/caregiver yes   Prior to hospitilization cognitive status: Alert/Oriented   Prior to hospitalization functional status: Independent   Current cognitive status: Alert/Oriented   Current Functional Status: Independent   Facility Arrived From: home   Lives With child(checo), dependent   Able to Return to Prior Arrangements yes   Is patient able to care for self after discharge? Yes   Who are your caregiver(s) and their phone number(s)? jayden somers [phone]   Patient's perception of discharge disposition home or selfcare   Readmission Within the Last 30 Days no previous admission in last 30 days   Patient currently being followed by outpatient case management? No   Patient currently receives any other outside agency services? No   Equipment Currently Used at Home none   Do you have any problems affording any of your prescribed medications? No   Is the patient taking medications as prescribed? yes   Does the patient have transportation home? Yes   Transportation Anticipated car, drives self;family or friend will provide   Dialysis Name and Scheduled days n/a   Does the patient receive services at the [facility]? No   Discharge Plan A Home   Discharge Plan B Home with family   DME Needed Upon Discharge  none   Patient/Family in Agreement with Plan yes   Introduced self to patient asked if ok to take a few min of their time for short interview for D/C planning and ok with patient

## 2020-07-10 NOTE — H&P
"[facility] Medicine History & Physical Examination   Patient Name: Hair Gil  MRN: [id number]  Patient Class: OP- Observation   Admission Date: 7/10/2020  1:47 AM  Length of Stay: 0  Attending Physician:   Primary Care Provider: Primary Doctor No  Face-to-Face encounter date: 07/10/2020  Code Status: Full Code  MPOA:  Chief Complaint: Weakness (onset "this morning" / "weakness / not eating / feel dehydrated / body hurting' ) and Fever        Patient information was obtained from patient, past medical records and ER records.   HISTORY OF PRESENT ILLNESS:   Hair Gil is a 40 y.o. old  female who  has a past medical history of Anxiety, Depression, and Hypertension.. The patient presented to [facility] on 7/10/2020 with a primary complaint of Weakness (onset "this morning" / "weakness / not eating / feel dehydrated / body hurting' ) and Fever  .     40-year-old  female presents emergency room with weakness headaches and increased swelling to her suprapubic region.      This patient has a known history of hypertension IV drug abuse, depression and anxiety.      She presents today with fever and generalized body aches.  The patient is was seen in the emergency room last night for a large abscess to her mons pubis region on the right side.      This wound was I&D incision and drained and packed by the ER physician.  The patient was discharged home on Bactrim.  The patient states she took 2 doses of the Bactrim but noted increased swelling and pain to her right groin region so she came back to the emergency room for further evaluation.      She endorses subjective fever and chills and generalized body aches.  She describes her symptoms as moderate in severity with no alleviating or exacerbating factors.      She also complaints of some dysuria and decreased appetite.  She denies chest pain, shortness of breath, hematemesis hemoptysis lightheadedness dizziness or syncope.  " She did complain of headache that she describes her usual migraine headaches.      The patient has a known history of IV drug abuse and states she had last use meth on the 4th of July    For COVID-19 test was negative today  REVIEW OF SYSTEMS:   10 Point Review of System was performed and was found to be negative except for that mentioned already in the HPI and   Review of Systems (Negative unless checked off)  Review of Systems   Constitutional: Positive for chills and malaise/fatigue.   HENT: Negative.    Eyes: Negative.    Respiratory: Negative.    Cardiovascular: Negative.    Gastrointestinal: Negative.    Genitourinary: Negative.    Musculoskeletal: Positive for myalgias.   Skin:        Abscess to mons pubis   Neurological: Positive for headaches.   Endo/Heme/Allergies: Negative.    Psychiatric/Behavioral: Positive for depression and substance abuse.           PAST MEDICAL HISTORY:     Past Medical History:   Diagnosis Date    Anxiety     Depression     Hypertension        PAST SURGICAL HISTORY:     Past Surgical History:   Procedure Laterality Date    CHOLECYSTECTOMY      TUBAL LIGATION      TYMPANOSTOMY TUBE PLACEMENT         ALLERGIES:   Patient has no known allergies.    FAMILY HISTORY:   History reviewed. No pertinent family history.    SOCIAL HISTORY:     Social History     Tobacco Use    Smoking status: Never Smoker   Substance Use Topics    Alcohol use: Not on file        Social History     Substance and Sexual Activity   Sexual Activity Not on file        HOME MEDICATIONS:     Prior to Admission medications    Medication Sig Start Date End Date Taking? Authorizing Provider   acetaminophen (TYLENOL) 650 MG TbSR Take 1 tablet (650 mg total) by mouth every 8 (eight) hours. 4/9/20   Rajeev Boateng MD   benazepril-hydrochlorthiazide (LOTENSIN HCT) 20-12.5 mg per tablet Take 1 tablet by mouth once daily.    Historical Provider, MD   buPROPion (WELLBUTRIN XL) 150 MG TB24 tablet Take 150 mg by mouth  "once daily.    Historical Provider, MD   diclofenac (VOLTAREN) 50 MG EC tablet Take 1 tablet (50 mg total) by mouth 3 (three) times daily as needed. 3/10/20   Raisa Back NP   mupirocin (BACTROBAN) 2 % ointment Apply topically 3 (three) times daily. 7/9/20   Luzma Taylor MD   pramipexole (MIRAPEX) 0.25 MG tablet Take 0.5 mg by mouth every evening.    Historical Provider, MD   sertraline (ZOLOFT) 100 MG tablet Take 100 mg by mouth once daily.    Historical Provider, MD   sulfamethoxazole-trimethoprim 800-160mg (BACTRIM DS) 800-160 mg Tab Take 1 tablet by mouth 2 (two) times daily. for 10 days 7/9/20 7/19/20  Luzma Taylor MD         PHYSICAL EXAM:   BP (!) 118/57   Pulse 77   Temp 99.8 °F (37.7 °C) (Oral)   Resp 16   Ht 5' 11" (1.803 m)   Wt 86.2 kg (190 lb)   LMP 06/27/2020 (Within Days)   SpO2 100%   BMI 26.50 kg/m²   Vitals Reviewed  General appearance: Well-developed, well-nourished female in no apparent distress.  Skin: No Rash.   Neuro: Motor and sensory exams grossly intact. Good tone. Power in all 4 extremities 5/5.   HENT: Atraumatic head. Moist mucous membranes of oral cavity.  Eyes: Normal extraocular movements.   Neck: Supple. No evidence of lymphadenopathy. No thyroidomegaly.  Lungs: Clear to auscultation bilaterally. No wheezing present.   Heart: Regular rate and rhythm. S1 and S2 present with no murmurs/gallop/rub. No pedal edema. No JVD present.   Abdomen: Soft, non-distended, non-tender. No rebound tenderness/guarding. No masses or organomegaly. Bowel sounds are normal. Bladder is not palpable.   Extremities: No cyanosis, clubbing, or edema.  Psych/mental status: Alert and oriented. Cooperative. Responds appropriately to questions.     : cellulitis and redness to mons pubis with palpable right groin lymph nodes  EMERGENCY DEPARTMENT LABS AND IMAGING:   Following labs were Reviewed   Recent Labs   Lab 07/10/20  0223   WBC 14.32*   HGB 12.4   HCT 38.1      CALCIUM " 9.1   ALBUMIN 3.6   PROT 7.4   *   K 3.7   CO2 24      BUN 17   CREATININE 0.8   ALKPHOS 70   ALT 18   AST 15   BILITOT 0.6         BMP:   Recent Labs   Lab 07/10/20  0223   GLU 94   *   K 3.7      CO2 24   BUN 17   CREATININE 0.8   CALCIUM 9.1   , CMP   Recent Labs   Lab 07/10/20  0223   *   K 3.7      CO2 24   GLU 94   BUN 17   CREATININE 0.8   CALCIUM 9.1   PROT 7.4   ALBUMIN 3.6   BILITOT 0.6   ALKPHOS 70   AST 15   ALT 18   ANIONGAP 9   ESTGFRAFRICA >60.0   EGFRNONAA >60.0   , CBC   Recent Labs   Lab 07/10/20  0223   WBC 14.32*   HGB 12.4   HCT 38.1      , INR No results found for: INR, PROTIME, Lipid Panel No results found for: CHOL, HDL, LDLCALC, TRIG, CHOLHDL, Troponin No results for input(s): TROPONINI in the last 168 hours., A1C: No results for input(s): HGBA1C in the last 4320 hours. and All labs within the past 24 hours have been reviewed  Microbiology Results (last 7 days)     Procedure Component Value Units Date/Time    Blood culture #2 **CANNOT BE ORDERED STAT** [[id number]] Collected: 07/10/20 0223    Order Status: Sent Specimen: Blood from Peripheral, Antecubital, Right Updated: 07/10/20 0234    Blood culture #1 **CANNOT BE ORDERED STAT** [[id number]] Collected: 07/10/20 0200    Order Status: Sent Specimen: Blood from Peripheral, Antecubital, Right Updated: 07/10/20 0233        X-Ray Chest AP Portable    (Results Pending)     No results found.  ASSESSMENT & PLAN:   Hair Gil is a 40 y.o. female admitted for    1. Cellulitis to mons pubis region failed out-pt treatment  - Vanc and Zosyn for now  -ID consult  - CRP/ESR  -IV hydration  - Possible Gen Surgery consult    2.IVDA  - last use of IV drugs was 7/4/2020    3. UTI  - urine culture sent  - IV Rocephin    DVT Prophylaxis: will be placed on SCDs for DVT prophylaxis and will be advised to be as mobile as possible and sit in a chair as tolerated.    ________________________________________________________________________________    Discharge Planning and Disposition: No mobility needs. Ambulating well. Good social support system. Patient will be discharged in   Face-to-Face encounter date: 07/10/2020  Encounter included review of the medical records, interviewing and examining the patient face-to-face, discussion with family and other health care providers including emergency medicine physician, admission orders, interpreting lab/test results and formulating a plan of care.   Medical Decision Making during this encounter was  [_] Low Complexity  [_] Moderate Complexity  [x] High Complexity  _________________________________________________________________________________    INPATIENT LIST OF MEDICATIONS     Current Facility-Administered Medications:     0.9%  NaCl infusion, , Intravenous, Continuous, Holley Singleton NP    acetaminophen tablet 650 mg, 650 mg, Oral, Q8H PRN, Holley Singleton NP    benazepril-hydrochlorthiazide 20-12.5 mg per tablet 1 tablet, 1 tablet, Oral, Daily, Holley Singleton NP    buPROPion TB24 tablet 150 mg, 150 mg, Oral, Daily, Holley Singleton NP    HYDROcodone-acetaminophen 5-325 mg per tablet 1 tablet, 1 tablet, Oral, Q6H PRN, Holley Singleton NP    morphine injection 1 mg, 1 mg, Intravenous, Q6H PRN, Holley Singleton NP    pramipexole tablet 0.5 mg, 0.5 mg, Oral, QHS, Holley Singleton NP    promethazine (PHENERGAN) 12.5 mg in dextrose 5 % 50 mL IVPB, 12.5 mg, Intravenous, Q6H PRN, Holley Singleton NP    sertraline tablet 100 mg, 100 mg, Oral, Daily, Holley Singleton NP    sodium chloride 0.9% flush 10 mL, 10 mL, Intravenous, PRN, Holley Singleton NP    trazodone split tablet 25 mg, 25 mg, Oral, Nightly PRN, Holley Singleton NP    Pharmacy to dose Vancomycin consult, , , Once **AND** vancomycin - pharmacy to dose, , Intravenous, pharmacy to manage frequency, Luzma Taylor MD    Pharmacy to dose Vancomycin consult, , , Once **AND**  vancomycin - pharmacy to dose, , Intravenous, pharmacy to manage frequency, Holley Singleton, CODY    Current Outpatient Medications:     acetaminophen (TYLENOL) 650 MG TbSR, Take 1 tablet (650 mg total) by mouth every 8 (eight) hours., Disp: 40 tablet, Rfl: 0    benazepril-hydrochlorthiazide (LOTENSIN HCT) 20-12.5 mg per tablet, Take 1 tablet by mouth once daily., Disp: , Rfl:     buPROPion (WELLBUTRIN XL) 150 MG TB24 tablet, Take 150 mg by mouth once daily., Disp: , Rfl:     diclofenac (VOLTAREN) 50 MG EC tablet, Take 1 tablet (50 mg total) by mouth 3 (three) times daily as needed., Disp: 20 tablet, Rfl: 2    mupirocin (BACTROBAN) 2 % ointment, Apply topically 3 (three) times daily., Disp: 1 Tube, Rfl: 0    pramipexole (MIRAPEX) 0.25 MG tablet, Take 0.5 mg by mouth every evening., Disp: , Rfl:     sertraline (ZOLOFT) 100 MG tablet, Take 100 mg by mouth once daily., Disp: , Rfl:     sulfamethoxazole-trimethoprim 800-160mg (BACTRIM DS) 800-160 mg Tab, Take 1 tablet by mouth 2 (two) times daily. for 10 days, Disp: 20 tablet, Rfl: 0      Scheduled Meds:   benazepril-hydrochlorthiazide  1 tablet Oral Daily    buPROPion  150 mg Oral Daily    pramipexole  0.5 mg Oral QHS    sertraline  100 mg Oral Daily     Continuous Infusions:   sodium chloride 0.9%       PRN Meds:.acetaminophen, HYDROcodone-acetaminophen, morphine, promethazine (PHENERGAN) IVPB, sodium chloride 0.9%, trazodone, Pharmacy to dose Vancomycin consult **AND** vancomycin - pharmacy to dose, Pharmacy to dose Vancomycin consult **AND** vancomycin - pharmacy to dose      Holley Singleton  [facility] Hospitalist NP  07/10/2020

## 2020-07-10 NOTE — ED PROVIDER NOTES
"Encounter Date: 7/10/2020       History     Chief Complaint   Patient presents with    Weakness     onset "this morning" / "weakness / not eating / feel dehydrated / body hurting'     Fever     40-year-old female with a past medical history of hypertension, depression, anxiety and polysubstance abuse return to the emergency department with body aches and fever.  I saw this patient in the emergency department last night.  She presented with a large abscess with surrounding cellulitis to the right side of her mons pubis.  The abscess was drained successfully and packed.  Patient was discharged on Bactrim.  The patient states that she has taken 2 doses of Bactrim today.  She states that throughout the day she has had progressively worsening body aches and subjective fevers and chills.  She does not own a thermometer.  She states that she has had decreased appetite.  She has not urinated all day because she is unable to drink any fluids secondary to her symptoms.  She also has a mild, generalized throbbing headache that was not acute in nature and is not associated with any neck stiffness.  She denies any upper respiratory symptoms, cough or shortness of breath.  She denies any urinary symptoms.  No known sick contacts.        Review of patient's allergies indicates:  No Known Allergies  Past Medical History:   Diagnosis Date    Anxiety     Depression     Hypertension      Past Surgical History:   Procedure Laterality Date    CHOLECYSTECTOMY      TUBAL LIGATION      TYMPANOSTOMY TUBE PLACEMENT       No family history on file.  Social History     Tobacco Use    Smoking status: Never Smoker   Substance Use Topics    Alcohol use: Not on file    Drug use: Not on file     Review of Systems   Constitutional: Positive for appetite change, chills, fatigue and fever. Negative for diaphoresis.   HENT: Negative for congestion.    Eyes: Negative for visual disturbance.   Respiratory: Negative for cough and shortness of " breath.    Cardiovascular: Negative for chest pain and palpitations.   Gastrointestinal: Negative for abdominal distention, diarrhea, nausea and vomiting.   Genitourinary: Negative for dysuria and flank pain.   Musculoskeletal: Positive for myalgias. Negative for back pain, neck pain and neck stiffness.   Skin: Positive for wound. Negative for pallor.   Neurological: Positive for light-headedness and headaches. Negative for weakness and numbness.   Psychiatric/Behavioral: Negative for confusion.   All other systems reviewed and are negative.      Physical Exam     Initial Vitals [07/10/20 0144]   BP Pulse Resp Temp SpO2   123/70 90 16 99.8 °F (37.7 °C) 100 %      MAP       --         Physical Exam    Nursing note and vitals reviewed.  Constitutional: She appears well-developed and well-nourished.   Tearful, ill-appearing   HENT:   Head: Normocephalic and atraumatic.   Eyes: EOM are normal.   Neck: Normal range of motion. Neck supple.   No meningismus   Cardiovascular: Normal rate, regular rhythm, normal heart sounds and intact distal pulses.   No murmur heard.  Pulmonary/Chest: Breath sounds normal. She has no wheezes. She has no rhonchi. She has no rales.   Abdominal: Soft. There is no abdominal tenderness. There is no rebound and no guarding.   Musculoskeletal: Normal range of motion. No edema.   Lymphadenopathy:     She has no cervical adenopathy.   Neurological: She is alert and oriented to person, place, and time. She has normal strength. GCS score is 15. GCS eye subscore is 4. GCS verbal subscore is 5. GCS motor subscore is 6.   Skin: Skin is warm and dry. Capillary refill takes less than 2 seconds.   Right mons pubis with moderate increase in cellulitis but decreased area of induration and fluctuance, small amount of drainage from incision site, packing in place, mild right inguinal lymphadenopathy   Psychiatric: She has a normal mood and affect.         ED Course   Procedures  Labs Reviewed   CBC W/ AUTO  DIFFERENTIAL - Abnormal; Notable for the following components:       Result Value    WBC 14.32 (*)     Immature Granulocytes 0.6 (*)     Gran # (ANC) 11.9 (*)     Immature Grans (Abs) 0.08 (*)     Gran% 83.3 (*)     Lymph% 6.9 (*)     All other components within normal limits   COMPREHENSIVE METABOLIC PANEL - Abnormal; Notable for the following components:    Sodium 134 (*)     All other components within normal limits   URINALYSIS, REFLEX TO URINE CULTURE - Abnormal; Notable for the following components:    Protein, UA Trace (*)     Glucose, UA Trace (*)     Ketones, UA 1+ (*)     Leukocytes, UA Trace (*)     All other components within normal limits    Narrative:     Specimen Source->Urine   URINALYSIS MICROSCOPIC - Abnormal; Notable for the following components:    WBC, UA 6 (*)     Hyaline Casts, UA 21 (*)     All other components within normal limits    Narrative:     Specimen Source->Urine   CULTURE, BLOOD   CULTURE, BLOOD   LACTIC ACID, PLASMA   SARS-COV-2 RNA AMPLIFICATION, QUAL   POCT URINE PREGNANCY          Imaging Results          X-Ray Chest AP Portable (In process)                  Medical Decision Making:   ED Management:  40-year-old female returns emergency department with subjective fever and myalgias.  The patient's abscess has improved significantly but the cellulitis has worsened despite oral antibiotics.  She does have a leukocytosis.  Normal lactic acid.  No shock.  Urinalysis does have trace leukocytes and rare bacteria but the patient has no urinary symptoms.  Given her worsening symptoms she will be admitted for IV antibiotics until cultures result.    Luzma Taylor MD  Emergency Medicine  07/10/2020 4:14 AM                                   Clinical Impression:       ICD-10-CM ICD-9-CM   1. Cellulitis of other specified site  L03.818 682.8   2. Fever  R50.9 780.60                                Luzma Taylor MD  07/10/20 0415

## 2020-07-10 NOTE — PROGRESS NOTES
Therapy with Vancomycin complete and / or consult / order discontinued by Dr Hernández on 7- @ 14:45   Pharmacy will sign off, please re-consult as needed.  Thank you for allowing us to participate in this patient's care.  Regis Finnegan 7/10/2020 2:58 PM  Dept of Pharmacy  Ext [phone]

## 2020-07-10 NOTE — PLAN OF CARE
07/10/20 1526   Patient Assessment/Suction   Level of Consciousness (AVPU) alert   Respiratory Effort Normal;Unlabored   Expansion/Accessory Muscles/Retractions no use of accessory muscles   Rhythm/Pattern, Respiratory unlabored;pattern regular;depth regular   Cough Frequency no cough   PRE-TX-O2   O2 Device (Oxygen Therapy) room air   SpO2 99 %   Pulse 95   Resp 14   Respiratory Evaluation   $ Care Plan Tech Time 15 min

## 2020-07-11 LAB
ALBUMIN SERPL BCP-MCNC: 3.1 G/DL (ref 3.5–5.2)
ALP SERPL-CCNC: 65 U/L (ref 55–135)
ALT SERPL W/O P-5'-P-CCNC: 42 U/L (ref 10–44)
ANION GAP SERPL CALC-SCNC: 9 MMOL/L (ref 8–16)
AST SERPL-CCNC: 29 U/L (ref 10–40)
BASOPHILS # BLD AUTO: 0.02 K/UL (ref 0–0.2)
BASOPHILS NFR BLD: 0.3 % (ref 0–1.9)
BILIRUB SERPL-MCNC: 0.4 MG/DL (ref 0.1–1)
BUN SERPL-MCNC: 11 MG/DL (ref 6–20)
CALCIUM SERPL-MCNC: 8.7 MG/DL (ref 8.7–10.5)
CHLORIDE SERPL-SCNC: 103 MMOL/L (ref 95–110)
CO2 SERPL-SCNC: 28 MMOL/L (ref 23–29)
CREAT SERPL-MCNC: 0.8 MG/DL (ref 0.5–1.4)
DIFFERENTIAL METHOD: ABNORMAL
EOSINOPHIL # BLD AUTO: 0.4 K/UL (ref 0–0.5)
EOSINOPHIL NFR BLD: 6.3 % (ref 0–8)
ERYTHROCYTE [DISTWIDTH] IN BLOOD BY AUTOMATED COUNT: 13.2 % (ref 11.5–14.5)
EST. GFR  (AFRICAN AMERICAN): >60 ML/MIN/1.73 M^2
EST. GFR  (NON AFRICAN AMERICAN): >60 ML/MIN/1.73 M^2
GLUCOSE SERPL-MCNC: 104 MG/DL (ref 70–110)
HCT VFR BLD AUTO: 36 % (ref 37–48.5)
HGB BLD-MCNC: 11.4 G/DL (ref 12–16)
IMM GRANULOCYTES # BLD AUTO: 0.08 K/UL (ref 0–0.04)
IMM GRANULOCYTES NFR BLD AUTO: 1.2 % (ref 0–0.5)
LYMPHOCYTES # BLD AUTO: 1.6 K/UL (ref 1–4.8)
LYMPHOCYTES NFR BLD: 24.1 % (ref 18–48)
MAGNESIUM SERPL-MCNC: 2.1 MG/DL (ref 1.6–2.6)
MCH RBC QN AUTO: 29.8 PG (ref 27–31)
MCHC RBC AUTO-ENTMCNC: 31.7 G/DL (ref 32–36)
MCV RBC AUTO: 94 FL (ref 82–98)
MONOCYTES # BLD AUTO: 0.8 K/UL (ref 0.3–1)
MONOCYTES NFR BLD: 12.9 % (ref 4–15)
NEUTROPHILS # BLD AUTO: 3.6 K/UL (ref 1.8–7.7)
NEUTROPHILS NFR BLD: 55.2 % (ref 38–73)
NRBC BLD-RTO: 0 /100 WBC
PLATELET # BLD AUTO: 262 K/UL (ref 150–350)
PMV BLD AUTO: 11.2 FL (ref 9.2–12.9)
POTASSIUM SERPL-SCNC: 4.1 MMOL/L (ref 3.5–5.1)
PROT SERPL-MCNC: 6.4 G/DL (ref 6–8.4)
RBC # BLD AUTO: 3.82 M/UL (ref 4–5.4)
SODIUM SERPL-SCNC: 140 MMOL/L (ref 136–145)
WBC # BLD AUTO: 6.51 K/UL (ref 3.9–12.7)

## 2020-07-11 PROCEDURE — 36415 COLL VENOUS BLD VENIPUNCTURE: CPT

## 2020-07-11 PROCEDURE — 80053 COMPREHEN METABOLIC PANEL: CPT

## 2020-07-11 PROCEDURE — 12000002 HC ACUTE/MED SURGE SEMI-PRIVATE ROOM

## 2020-07-11 PROCEDURE — 25000003 PHARM REV CODE 250: Performed by: NURSE PRACTITIONER

## 2020-07-11 PROCEDURE — 83735 ASSAY OF MAGNESIUM: CPT

## 2020-07-11 PROCEDURE — G0378 HOSPITAL OBSERVATION PER HR: HCPCS

## 2020-07-11 PROCEDURE — 25000003 PHARM REV CODE 250: Performed by: INTERNAL MEDICINE

## 2020-07-11 PROCEDURE — 85025 COMPLETE CBC W/AUTO DIFF WBC: CPT

## 2020-07-11 RX ADMIN — BENAZEPRIL HYDROCHLORIDE 20 MG: 20 TABLET, COATED ORAL at 08:07

## 2020-07-11 RX ADMIN — BUPROPION HYDROCHLORIDE 150 MG: 150 TABLET, EXTENDED RELEASE ORAL at 08:07

## 2020-07-11 RX ADMIN — CLINDAMYCIN IN 5 PERCENT DEXTROSE 900 MG: 18 INJECTION, SOLUTION INTRAVENOUS at 03:07

## 2020-07-11 RX ADMIN — PRAMIPEXOLE DIHYDROCHLORIDE 0.5 MG: 0.12 TABLET ORAL at 08:07

## 2020-07-11 RX ADMIN — CLINDAMYCIN IN 5 PERCENT DEXTROSE 900 MG: 18 INJECTION, SOLUTION INTRAVENOUS at 11:07

## 2020-07-11 RX ADMIN — HYDROCHLOROTHIAZIDE 12.5 MG: 12.5 TABLET ORAL at 08:07

## 2020-07-11 RX ADMIN — CLINDAMYCIN IN 5 PERCENT DEXTROSE 900 MG: 18 INJECTION, SOLUTION INTRAVENOUS at 08:07

## 2020-07-11 RX ADMIN — SERTRALINE HYDROCHLORIDE 100 MG: 50 TABLET ORAL at 08:07

## 2020-07-11 NOTE — PLAN OF CARE
Problem: Adult Inpatient Plan of Care  Goal: Plan of Care Review  Outcome: Ongoing, Progressing  Goal: Patient-Specific Goal (Individualization)  Outcome: Ongoing, Progressing  Goal: Absence of Hospital-Acquired Illness or Injury  Outcome: Ongoing, Progressing  Goal: Optimal Comfort and Wellbeing  Outcome: Ongoing, Progressing  Goal: Readiness for Transition of Care  Outcome: Ongoing, Progressing  Goal: Rounds/Family Conference  Outcome: Ongoing, Progressing     Problem: Fall Injury Risk  Goal: Absence of Fall and Fall-Related Injury  Outcome: Ongoing, Progressing     Problem: Wound  Goal: Optimal Wound Healing  Outcome: Ongoing, Progressing     Problem: Infection  Goal: Infection Symptom Resolution  Outcome: Ongoing, Progressing

## 2020-07-11 NOTE — PROGRESS NOTES
"[facility] Medicine  Progress Note  Patient Name: Hair Gil  MRN: [id number]  Patient Class: OP- Observation   Admission Date: 7/10/2020  1:47 AM   Attending Physician:  Scott Hernández MD  Primary Care Provider: Primary Doctor No  Face-to-Face encounter date: 07/11/2020     HISTORY OF PRESENT ILLNESS:   Hair Gil is a 40 y.o. old  female who  has a past medical history of Anxiety, Depression, and Hypertension.. The patient presented to [facility] on 7/10/2020 with a primary complaint of Weakness (onset "this morning" / "weakness / not eating / feel dehydrated / body hurting' ) and Fever  40-year-old  female presents emergency room with weakness headaches and increased swelling to her suprapubic region.    This patient has a known history of hypertension IV drug abuse, depression and anxiety.    She presents today with fever and generalized body aches.  The patient is was seen in the emergency room last night for a large abscess to her mons pubis region on the right side.   This wound was I&D incision and drained and packed by the ER physician.  The patient was discharged home on Bactrim.  The patient states she took 2 doses of the Bactrim but noted increased swelling and pain to her right groin region so she came back to the emergency room for further evaluation.    She endorses subjective fever and chills and generalized body aches.  She describes her symptoms as moderate in severity with no alleviating or exacerbating factors.    She also complaints of some dysuria and decreased appetite.  She denies chest pain, shortness of breath, hematemesis hemoptysis lightheadedness dizziness or syncope.  She did complain of headache that she describes her usual migraine headaches.    The patient has a known history of IV drug abuse and states she had last use meth on the 4th of July  For COVID-19 test was negative today    Interval history:     Today the patient reports " persistent but improving redness and swelling to mons pubis, constant timing, moderate intensity, slowly improving with medical treatment.  She reports persistent drainage from site of I&D.  No fever or chills.  No abdominal pain, nausea, vomiting, shortness of breath, or chest pain.  She is mobilizing.  She is eating well.  She reports persistent redness and swelling of trunk and extremities although it is slowly improving.    PHYSICAL EXAM:     Vitals Reviewed  General appearance:  Comfortable appearing, nontoxic, no apparent distress  Skin:  Dry and warm no jaundice, persistent redness and edema of trunk and bilateral arms which has improved some from yesterday  Neuro:  Nonfocal motor exam, alert and oriented, fluent speech  HENT:  Moist mucous membranes, no thrush  Eyes:  Anicteric sclerae, no conjunctival discharge, PERRLA  Lungs: Clear to auscultation bilaterally.  Comfortable work of breathing  Heart: Regular rate and rhythm.  2+ radial pulses, regular rate and rhythm.    Abdomen: Soft, non-distended, non-tender.  Psych:  Mood is calm, affect normal, insight good  : cellulitis and redness to mons pubis extending into superior right labia majora with status post I&D site with minimal purulent drainage, persistent induration slightly improved from yesterday, no palpable fluctuance    LABS AND IMAGING:   Following labs were Reviewed   Recent Labs   Lab 07/11/20  0640   WBC 6.51   HGB 11.4*   HCT 36.0*      CALCIUM 8.7   ALBUMIN 3.1*   PROT 6.4      K 4.1   CO2 28      BUN 11   CREATININE 0.8   ALKPHOS 65   ALT 42   AST 29   BILITOT 0.4         BMP:   Recent Labs   Lab 07/10/20  0223 07/11/20  0640   GLU 94 104   * 140   K 3.7 4.1    103   CO2 24 28   BUN 17 11   CREATININE 0.8 0.8   CALCIUM 9.1 8.7   MG  --  2.1   , CMP   Recent Labs   Lab 07/10/20  0223 07/11/20  0640   * 140   K 3.7 4.1    103   CO2 24 28   GLU 94 104   BUN 17 11   CREATININE 0.8 0.8   CALCIUM 9.1  8.7   PROT 7.4 6.4   ALBUMIN 3.6 3.1*   BILITOT 0.6 0.4   ALKPHOS 70 65   AST 15 29   ALT 18 42   ANIONGAP 9 9   ESTGFRAFRICA >60.0 >60.0   EGFRNONAA >60.0 >60.0   , CBC   Recent Labs   Lab 07/10/20  0223 07/11/20  0640   WBC 14.32* 6.51   HGB 12.4 11.4*   HCT 38.1 36.0*    262   , INR No results found for: INR, PROTIME, Lipid Panel No results found for: CHOL, HDL, LDLCALC, TRIG, CHOLHDL, Troponin No results for input(s): TROPONINI in the last 168 hours., A1C:   Recent Labs   Lab 07/10/20  0623   HGBA1C 5.5    and All labs within the past 24 hours have been reviewed  Microbiology Results (last 7 days)     Procedure Component Value Units Date/Time    Blood culture #2 **CANNOT BE ORDERED STAT** [[id number]] Collected: 07/10/20 0223    Order Status: Completed Specimen: Blood from Peripheral, Antecubital, Right Updated: 07/11/20 0432     Blood Culture, Routine No Growth to date      No Growth to date    Blood culture #1 **CANNOT BE ORDERED STAT** [[id number]] Collected: 07/10/20 0200    Order Status: Completed Specimen: Blood from Peripheral, Antecubital, Right Updated: 07/11/20 0432     Blood Culture, Routine No Growth to date      No Growth to date        X-Ray Chest AP Portable   Final Result        No results found.  ASSESSMENT & PLAN:   Hair Gil is a 40 y.o. female admitted for    Cellulitis to mons pubis region failed out-pt treatment  Allergic reaction likely red man syndrome to vancomycin  - Vanc and Zosyn for now  -ID consult  - CRP/ESR  -IV hydration  - Possible Gen Surgery consult    IVDA  - last use of IV drugs was 7/4/2020    Anxiety  Hypertension  Hyperlipidemia    Plan update today:  Continue care on Med surg.  The patient is slowly improving.  Continue IV clindamycin.  Serial examination of infection site.  Continue to monitor improving redness and edema of trunk and extremities after adverse reaction yesterday.  IV Benadryl as needed for pruritus.  Local wound care  Supportive care including  pain control and antiemetics as needed  The patient is tolerating diet well.  Discontinue IV fluids.  Serial labs.  Mobilize as able.  VTE prophylaxis with SCDs  Moderate risk secondary to moderate intensity illness; prescription drug management  Discharge likely another 48-72 hours.    Scott Hernández  [facility] Hospitalist  07/11/2020

## 2020-07-11 NOTE — NURSING
Dsg right pubis falling off.  New one reapplied.  Packing left in place.  Min amount pink drainage noted.

## 2020-07-11 NOTE — PLAN OF CARE
Problem: Adult Inpatient Plan of Care  Goal: Plan of Care Review  Outcome: Ongoing, Progressing  Goal: Optimal Comfort and Wellbeing  Outcome: Ongoing, Progressing     Problem: Fall Injury Risk  Goal: Absence of Fall and Fall-Related Injury  Outcome: Ongoing, Progressing     Problem: Wound  Goal: Optimal Wound Healing  Outcome: Ongoing, Progressing     Problem: Infection  Goal: Infection Symptom Resolution  Outcome: Ongoing, Progressing

## 2020-07-11 NOTE — NURSING
Pt admitted with splotchy redness to skin and moderate swelling to fingers. At 1115 pt c/o itching and states that she thinks she may be allergic to one of the antibiotics she received in OR.  Pt has no other symptoms of anaphalaxis.  Dr Hernández notifed, orders recived for benedryl. After benedryl administered pt states itching is gone, splotchy redness and finger swelling remains.  No further issues.

## 2020-07-12 LAB
ALBUMIN SERPL BCP-MCNC: 3.3 G/DL (ref 3.5–5.2)
ALP SERPL-CCNC: 67 U/L (ref 55–135)
ALT SERPL W/O P-5'-P-CCNC: 42 U/L (ref 10–44)
ANION GAP SERPL CALC-SCNC: 7 MMOL/L (ref 8–16)
AST SERPL-CCNC: 21 U/L (ref 10–40)
BASOPHILS # BLD AUTO: 0.04 K/UL (ref 0–0.2)
BASOPHILS NFR BLD: 0.6 % (ref 0–1.9)
BILIRUB SERPL-MCNC: 0.4 MG/DL (ref 0.1–1)
BUN SERPL-MCNC: 13 MG/DL (ref 6–20)
CALCIUM SERPL-MCNC: 9 MG/DL (ref 8.7–10.5)
CHLORIDE SERPL-SCNC: 103 MMOL/L (ref 95–110)
CO2 SERPL-SCNC: 29 MMOL/L (ref 23–29)
CREAT SERPL-MCNC: 0.7 MG/DL (ref 0.5–1.4)
DIFFERENTIAL METHOD: ABNORMAL
EOSINOPHIL # BLD AUTO: 0.4 K/UL (ref 0–0.5)
EOSINOPHIL NFR BLD: 6.1 % (ref 0–8)
ERYTHROCYTE [DISTWIDTH] IN BLOOD BY AUTOMATED COUNT: 13.2 % (ref 11.5–14.5)
EST. GFR  (AFRICAN AMERICAN): >60 ML/MIN/1.73 M^2
EST. GFR  (NON AFRICAN AMERICAN): >60 ML/MIN/1.73 M^2
GLUCOSE SERPL-MCNC: 97 MG/DL (ref 70–110)
HCT VFR BLD AUTO: 38.4 % (ref 37–48.5)
HGB BLD-MCNC: 12.2 G/DL (ref 12–16)
IMM GRANULOCYTES # BLD AUTO: 0.19 K/UL (ref 0–0.04)
IMM GRANULOCYTES NFR BLD AUTO: 2.7 % (ref 0–0.5)
LYMPHOCYTES # BLD AUTO: 2.2 K/UL (ref 1–4.8)
LYMPHOCYTES NFR BLD: 31.3 % (ref 18–48)
MAGNESIUM SERPL-MCNC: 2.1 MG/DL (ref 1.6–2.6)
MCH RBC QN AUTO: 29.6 PG (ref 27–31)
MCHC RBC AUTO-ENTMCNC: 31.8 G/DL (ref 32–36)
MCV RBC AUTO: 93 FL (ref 82–98)
MONOCYTES # BLD AUTO: 1 K/UL (ref 0.3–1)
MONOCYTES NFR BLD: 13.5 % (ref 4–15)
NEUTROPHILS # BLD AUTO: 3.2 K/UL (ref 1.8–7.7)
NEUTROPHILS NFR BLD: 45.8 % (ref 38–73)
NRBC BLD-RTO: 0 /100 WBC
PLATELET # BLD AUTO: 318 K/UL (ref 150–350)
PMV BLD AUTO: 11.1 FL (ref 9.2–12.9)
POTASSIUM SERPL-SCNC: 4.9 MMOL/L (ref 3.5–5.1)
PROT SERPL-MCNC: 7.2 G/DL (ref 6–8.4)
RBC # BLD AUTO: 4.12 M/UL (ref 4–5.4)
SODIUM SERPL-SCNC: 139 MMOL/L (ref 136–145)
WBC # BLD AUTO: 7.05 K/UL (ref 3.9–12.7)

## 2020-07-12 PROCEDURE — 25000003 PHARM REV CODE 250: Performed by: INTERNAL MEDICINE

## 2020-07-12 PROCEDURE — 12000002 HC ACUTE/MED SURGE SEMI-PRIVATE ROOM

## 2020-07-12 PROCEDURE — 80053 COMPREHEN METABOLIC PANEL: CPT

## 2020-07-12 PROCEDURE — 85025 COMPLETE CBC W/AUTO DIFF WBC: CPT

## 2020-07-12 PROCEDURE — 83735 ASSAY OF MAGNESIUM: CPT

## 2020-07-12 PROCEDURE — 25000003 PHARM REV CODE 250: Performed by: NURSE PRACTITIONER

## 2020-07-12 PROCEDURE — 36415 COLL VENOUS BLD VENIPUNCTURE: CPT

## 2020-07-12 RX ADMIN — BENAZEPRIL HYDROCHLORIDE 20 MG: 20 TABLET, COATED ORAL at 08:07

## 2020-07-12 RX ADMIN — CLINDAMYCIN IN 5 PERCENT DEXTROSE 900 MG: 18 INJECTION, SOLUTION INTRAVENOUS at 11:07

## 2020-07-12 RX ADMIN — CLINDAMYCIN IN 5 PERCENT DEXTROSE 900 MG: 18 INJECTION, SOLUTION INTRAVENOUS at 08:07

## 2020-07-12 RX ADMIN — HYDROCHLOROTHIAZIDE 12.5 MG: 12.5 TABLET ORAL at 08:07

## 2020-07-12 RX ADMIN — CLINDAMYCIN IN 5 PERCENT DEXTROSE 900 MG: 18 INJECTION, SOLUTION INTRAVENOUS at 03:07

## 2020-07-12 RX ADMIN — BUPROPION HYDROCHLORIDE 150 MG: 150 TABLET, EXTENDED RELEASE ORAL at 08:07

## 2020-07-12 RX ADMIN — PRAMIPEXOLE DIHYDROCHLORIDE 0.5 MG: 0.12 TABLET ORAL at 08:07

## 2020-07-12 RX ADMIN — SERTRALINE HYDROCHLORIDE 100 MG: 50 TABLET ORAL at 08:07

## 2020-07-12 NOTE — PLAN OF CARE
Problem: Adult Inpatient Plan of Care  Goal: Plan of Care Review  Outcome: Ongoing, Progressing  Goal: Absence of Hospital-Acquired Illness or Injury  Outcome: Ongoing, Progressing  Goal: Optimal Comfort and Wellbeing  Outcome: Ongoing, Progressing     Problem: Fall Injury Risk  Goal: Absence of Fall and Fall-Related Injury  Outcome: Ongoing, Progressing     Problem: Wound  Goal: Optimal Wound Healing  Outcome: Ongoing, Progressing     Problem: Infection  Goal: Infection Symptom Resolution  Outcome: Ongoing, Progressing   Continue iv abx as ordered.

## 2020-07-12 NOTE — PROGRESS NOTES
"[facility] Medicine  Progress Note  Patient Name: Hair Gil  MRN: [id number]  Patient Class: IP- Inpatient   Admission Date: 7/10/2020  1:47 AM   Attending Physician:  Scott Hernández MD  Primary Care Provider: Primary Doctor No  Face-to-Face encounter date: 07/12/2020     HISTORY OF PRESENT ILLNESS:   Hair Gil is a 40 y.o. old  female who  has a past medical history of Anxiety, Depression, and Hypertension.. The patient presented to [facility] on 7/10/2020 with a primary complaint of Weakness (onset "this morning" / "weakness / not eating / feel dehydrated / body hurting' ) and Fever  40-year-old  female presents emergency room with weakness headaches and increased swelling to her suprapubic region.    This patient has a known history of hypertension IV drug abuse, depression and anxiety.    She presents today with fever and generalized body aches.  The patient is was seen in the emergency room last night for a large abscess to her mons pubis region on the right side.   This wound was I&D incision and drained and packed by the ER physician.  The patient was discharged home on Bactrim.  The patient states she took 2 doses of the Bactrim but noted increased swelling and pain to her right groin region so she came back to the emergency room for further evaluation.    She endorses subjective fever and chills and generalized body aches.  She describes her symptoms as moderate in severity with no alleviating or exacerbating factors.    She also complaints of some dysuria and decreased appetite.  She denies chest pain, shortness of breath, hematemesis hemoptysis lightheadedness dizziness or syncope.  She did complain of headache that she describes her usual migraine headaches.    The patient has a known history of IV drug abuse and states she had last use meth on the 4th of July  For COVID-19 test was negative today    Interval history:     Today the patient reports " persistent but improving redness and swelling to mons pubis, constant timing, mild to moderate intensity, slowly improving with medical treatment, with improved but persistent induration.  She reports improving drainage from site of I&D.  No fever or chills.  No abdominal pain, nausea, vomiting, shortness of breath, or chest pain.  Edema and redness of trunk and arms continues to improve.  Mobilizing well.    PHYSICAL EXAM:     Vitals Reviewed  General appearance:  Comfortable appearing, nontoxic, no apparent distress  Skin:  Dry and warm no jaundice, mild persistent redness and edema of trunk and bilateral arms which continues to slowly improve  Lungs: Clear to auscultation bilaterally.  Comfortable work of breathing  Heart: Regular rate and rhythm.  2+ radial pulses, regular rate and rhythm.    Psych:  Mood is calm, affect normal, insight good  : cellulitis and redness to mons pubis extending into superior right labia majora with status post I&D site with minimal purulent drainage, slightly improved from yesterday, no palpable fluctuance, small remaining area of induration    LABS AND IMAGING:   Following labs were Reviewed   Recent Labs   Lab 07/12/20 0616   WBC 7.05   HGB 12.2   HCT 38.4      CALCIUM 9.0   ALBUMIN 3.3*   PROT 7.2      K 4.9   CO2 29      BUN 13   CREATININE 0.7   ALKPHOS 67   ALT 42   AST 21   BILITOT 0.4         BMP:   Recent Labs   Lab 07/11/20  0640 07/12/20  0616    97    139   K 4.1 4.9    103   CO2 28 29   BUN 11 13   CREATININE 0.8 0.7   CALCIUM 8.7 9.0   MG 2.1 2.1   , CMP   Recent Labs   Lab 07/11/20  0640 07/12/20  0616    139   K 4.1 4.9    103   CO2 28 29    97   BUN 11 13   CREATININE 0.8 0.7   CALCIUM 8.7 9.0   PROT 6.4 7.2   ALBUMIN 3.1* 3.3*   BILITOT 0.4 0.4   ALKPHOS 65 67   AST 29 21   ALT 42 42   ANIONGAP 9 7*   ESTGFRAFRICA >60.0 >60.0   EGFRNONAA >60.0 >60.0   , CBC   Recent Labs   Lab 07/11/20  0640 07/12/20  0616    WBC 6.51 7.05   HGB 11.4* 12.2   HCT 36.0* 38.4    318   , INR No results found for: INR, PROTIME, Lipid Panel No results found for: CHOL, HDL, LDLCALC, TRIG, CHOLHDL, Troponin No results for input(s): TROPONINI in the last 168 hours., A1C:   Recent Labs   Lab 07/10/20  0623   HGBA1C 5.5    and All labs within the past 24 hours have been reviewed  Microbiology Results (last 7 days)     Procedure Component Value Units Date/Time    Blood culture #1 **CANNOT BE ORDERED STAT** [[id number]] Collected: 07/10/20 0200    Order Status: Completed Specimen: Blood from Peripheral, Antecubital, Right Updated: 07/12/20 0432     Blood Culture, Routine No Growth to date      No Growth to date      No Growth to date    Blood culture #2 **CANNOT BE ORDERED STAT** [[id number]] Collected: 07/10/20 0223    Order Status: Completed Specimen: Blood from Peripheral, Antecubital, Right Updated: 07/12/20 0432     Blood Culture, Routine No Growth to date      No Growth to date      No Growth to date        X-Ray Chest AP Portable   Final Result        No results found.  ASSESSMENT & PLAN:   Hair Gil is a 40 y.o. female admitted for    Cellulitis to mons pubis region failed out-pt treatment  Allergic reaction likely red man syndrome to vancomycin  - Vanc and Zosyn for now  -ID consult  - CRP/ESR  -IV hydration  - Possible Gen Surgery consult    IVDA  - last use of IV drugs was 7/4/2020    Anxiety  Hypertension  Hyperlipidemia    Plan update today:  Continue care on Med surg.  The patient is slowly improving.  Likely transition to oral antibiotics next 24-48  Continue IV clindamycin.  Serial examination of infection site.  Continue to monitor improving redness and edema of trunk and extremities after adverse reaction yesterday.  IV Benadryl as needed for pruritus.  Local wound care  Supportive care including pain control and antiemetics as needed  The patient is tolerating diet well.  Discontinue IV fluids.  Serial labs.  Mobilize as  able.  VTE prophylaxis with SCDs  Moderate risk secondary to moderate intensity illness; prescription drug management  Discharge likely another 24-48 hours.    Scott Hernández  [facility] Hospitalist  07/12/2020

## 2020-07-12 NOTE — PLAN OF CARE
Problem: Adult Inpatient Plan of Care  Goal: Patient-Specific Goal (Individualization)  Outcome: Ongoing, Progressing     Problem: Fall Injury Risk  Goal: Absence of Fall and Fall-Related Injury  Outcome: Ongoing, Progressing     Problem: Wound  Goal: Optimal Wound Healing  Outcome: Ongoing, Progressing

## 2020-07-13 VITALS
OXYGEN SATURATION: 98 % | WEIGHT: 190 LBS | HEIGHT: 71 IN | TEMPERATURE: 98 F | SYSTOLIC BLOOD PRESSURE: 128 MMHG | HEART RATE: 90 BPM | DIASTOLIC BLOOD PRESSURE: 77 MMHG | BODY MASS INDEX: 26.6 KG/M2 | RESPIRATION RATE: 16 BRPM

## 2020-07-13 LAB
ANION GAP SERPL CALC-SCNC: 13 MMOL/L (ref 8–16)
BUN SERPL-MCNC: 19 MG/DL (ref 6–20)
CALCIUM SERPL-MCNC: 9.2 MG/DL (ref 8.7–10.5)
CHLORIDE SERPL-SCNC: 99 MMOL/L (ref 95–110)
CO2 SERPL-SCNC: 25 MMOL/L (ref 23–29)
CREAT SERPL-MCNC: 0.9 MG/DL (ref 0.5–1.4)
ERYTHROCYTE [DISTWIDTH] IN BLOOD BY AUTOMATED COUNT: 13.1 % (ref 11.5–14.5)
EST. GFR  (AFRICAN AMERICAN): >60 ML/MIN/1.73 M^2
EST. GFR  (NON AFRICAN AMERICAN): >60 ML/MIN/1.73 M^2
GLUCOSE SERPL-MCNC: 92 MG/DL (ref 70–110)
HCT VFR BLD AUTO: 41.7 % (ref 37–48.5)
HGB BLD-MCNC: 13.3 G/DL (ref 12–16)
MAGNESIUM SERPL-MCNC: 2.1 MG/DL (ref 1.6–2.6)
MCH RBC QN AUTO: 29.1 PG (ref 27–31)
MCHC RBC AUTO-ENTMCNC: 31.9 G/DL (ref 32–36)
MCV RBC AUTO: 91 FL (ref 82–98)
PLATELET # BLD AUTO: 371 K/UL (ref 150–350)
PMV BLD AUTO: 10.7 FL (ref 9.2–12.9)
POTASSIUM SERPL-SCNC: 3.9 MMOL/L (ref 3.5–5.1)
RBC # BLD AUTO: 4.57 M/UL (ref 4–5.4)
SODIUM SERPL-SCNC: 137 MMOL/L (ref 136–145)
WBC # BLD AUTO: 9.26 K/UL (ref 3.9–12.7)

## 2020-07-13 PROCEDURE — 36415 COLL VENOUS BLD VENIPUNCTURE: CPT

## 2020-07-13 PROCEDURE — 80048 BASIC METABOLIC PNL TOTAL CA: CPT

## 2020-07-13 PROCEDURE — 25000003 PHARM REV CODE 250: Performed by: NURSE PRACTITIONER

## 2020-07-13 PROCEDURE — 83735 ASSAY OF MAGNESIUM: CPT

## 2020-07-13 PROCEDURE — 25000003 PHARM REV CODE 250: Performed by: INTERNAL MEDICINE

## 2020-07-13 PROCEDURE — 85027 COMPLETE CBC AUTOMATED: CPT

## 2020-07-13 RX ORDER — CLINDAMYCIN HYDROCHLORIDE 300 MG/1
300 CAPSULE ORAL EVERY 8 HOURS
Qty: 33 CAPSULE | Refills: 0
Start: 2020-07-13 | End: 2020-07-24

## 2020-07-13 RX ADMIN — HYDROCHLOROTHIAZIDE 12.5 MG: 12.5 TABLET ORAL at 08:07

## 2020-07-13 RX ADMIN — BUPROPION HYDROCHLORIDE 150 MG: 150 TABLET, EXTENDED RELEASE ORAL at 08:07

## 2020-07-13 RX ADMIN — CLINDAMYCIN IN 5 PERCENT DEXTROSE 900 MG: 18 INJECTION, SOLUTION INTRAVENOUS at 08:07

## 2020-07-13 RX ADMIN — BENAZEPRIL HYDROCHLORIDE 20 MG: 20 TABLET, COATED ORAL at 08:07

## 2020-07-13 RX ADMIN — SERTRALINE HYDROCHLORIDE 100 MG: 50 TABLET ORAL at 08:07

## 2020-07-13 NOTE — DISCHARGE SUMMARY
"[facility] Medicine  Discharge Summary      Patient Name: Hair Gil  MRN: [id number]  Admission Date: 7/10/2020  Discharge Date and Time:  07/13/2020 3:18 PM  Attending Physician: Scott Hernández MD   Primary Care Provider: Primary Doctor No    HPI:  Hair Gil is a 40 y.o. old  female who  has a past medical history of Anxiety, Depression, and Hypertension.. The patient presented to [facility] on 7/10/2020 with a primary complaint of Weakness (onset "this morning" / "weakness / not eating / feel dehydrated / body hurting' ) and Fever  40-year-old  female presents emergency room with weakness headaches and increased swelling to her suprapubic region.    This patient has a known history of hypertension IV drug abuse, depression and anxiety.    She presents today with fever and generalized body aches.  The patient is was seen in the emergency room last night for a large abscess to her mons pubis region on the right side.   This wound was I&D incision and drained and packed by the ER physician.  The patient was discharged home on Bactrim.  The patient states she took 2 doses of the Bactrim but noted increased swelling and pain to her right groin region so she came back to the emergency room for further evaluation.    She endorses subjective fever and chills and generalized body aches.  She describes her symptoms as moderate in severity with no alleviating or exacerbating factors.    She also complaints of some dysuria and decreased appetite.  She denies chest pain, shortness of breath, hematemesis hemoptysis lightheadedness dizziness or syncope.  She did complain of headache that she describes her usual migraine headaches.    The patient has a known history of IV drug abuse and states she had last use meth on the 4th of July  For COVID-19 test was negative today     Hospital course:  The patient was admitted to the hospital for workup and treatment including " empiric broad-spectrum IV antibiotics.  She had incision and drainage of abscess of the mons pubis/superior labia majora.  Initially the patient had a reaction to antibiotic regimen with redness and swelling of upper extremities, trunk, neck, and face.  Antibiotics were discontinued and she received IV Benadryl for pruritus; her reaction was most consistent with red man syndrome to vancomycin.  Subsequently she was transition to IV clindamycin.  She received local wound care.  The patient slowly improved with medical treatment.  Due to sensitivity of area, the cellulitis was slow to improve due to significant swelling and induration.  Cultures revealed MRSA sensitive to clindamycin.  After significant improvement, the patient is now medically stable for discharge home with oral antibiotic regimen.  She will follow-up with primary physician in the next 2 weeks.  The patient is in understanding and agreement with the discharge plan today.    Discharge diagnoses:  Cellulitis to mons pubis with associated abscess failed out-pt treatment  Mons pubis/labial Abscess status post I and D  Allergic reaction likely red man syndrome to vancomycin  IVDA   Anxiety  Hypertension  Hyperlipidemia  Additional discharge Diagnoses:    Diagnosis Date Noted POA    PRINCIPAL PROBLEM:  Cellulitis of mons pubis region [L03.90] 07/10/2020 Yes    Leukocytosis [D72.829] 07/10/2020 Yes    IVDU (intravenous drug user) [F19.90] 07/10/2020 Yes    Depression [F32.9] 07/10/2020 Yes    UTI (urinary tract infection) [N39.0] 07/10/2020 Yes     Discharge physical exam:  General appearance:  Comfortable appearing, nontoxic, no apparent distress  Skin:  Dry and warm no jaundice, resolved redness and edema of trunk and bilateral arms   Lungs: Clear to auscultation bilaterally.  Comfortable work of breathing  Heart: Regular rate and rhythm.  2+ radial pulses, regular rate and rhythm.    Psych:  Mood is calm, affect normal, insight good  :  Resolved  cellulitis and redness of mons pubis with healing site status post I&D site without purulent drainage,  small remaining area of induration improving    Discharged Condition: stable    Disposition: Home or Self Care    Follow Up:  Follow-up Information     PCP of choice In 1 week.               Patient Instructions:      Diet Adult Regular     Order Specific Question Answer Comments   Na restriction, if any: 2gNa      Notify your health care provider if you experience any of the following:  temperature >100.4     Notify your health care provider if you experience any of the following:  severe uncontrolled pain     Notify your health care provider if you experience any of the following:  worsening rash     Change dressing (specify)   Order Comments: Wash wound 1-2 x daily with soap and water; keep dry and clean and covered; topical triple antibacterial ointment is okay; avoid submerge under water (no baths/swimming); shower okay;     Activity as tolerated       Pending Diagnostic Studies:     None         Medications:  Reconciled Home Medications:      Medication List      START taking these medications    clindamycin 300 MG capsule  Commonly known as: CLEOCIN  Take 1 capsule (300 mg total) by mouth every 8 (eight) hours. for 11 days        CONTINUE taking these medications    acetaminophen 650 MG Tbsr  Commonly known as: TYLENOL  Take 1 tablet (650 mg total) by mouth every 8 (eight) hours.     benazepril-hydrochlorthiazide 20-12.5 mg per tablet  Commonly known as: LOTENSIN HCT  Take 1 tablet by mouth once daily.     buPROPion 150 MG TB24 tablet  Commonly known as: WELLBUTRIN XL  Take 150 mg by mouth once daily.     diclofenac 50 MG EC tablet  Commonly known as: VOLTAREN  Take 1 tablet (50 mg total) by mouth 3 (three) times daily as needed.     pramipexole 0.25 MG tablet  Commonly known as: MIRAPEX  Take 0.5 mg by mouth every evening.     sertraline 100 MG tablet  Commonly known as: ZOLOFT  Take 100 mg by mouth once  daily.        STOP taking these medications    mupirocin 2 % ointment  Commonly known as: BACTROBAN            Indwelling Lines/Drains at time of discharge:   Lines/Drains/Airways     None                 Time spent on the discharge of patient: 36 minutes  Patient was seen and examined on the date of discharge and determined to be suitable for discharge.         Scott Hernández MD  Department of Hospital Medicine  [facility]

## 2020-07-13 NOTE — PHYSICIAN QUERY
PT Name: Hair Gil  MR #: [id number]    Consultant Diagnosis Clarification     CDS/: [place] Bowman Pe               Contact information: [phone]  This form is a permanent document in the medical record.    Query Date: July 13, 2020      By submitting this query, we are merely seeking further clarification of documentation.  Please utilize your independent clinical judgment when addressing the question(s) below.    The Medical Record reflects the following:    Per H&P : UTI   07/10 UA - WBC 6, RBC 1, Bacteria - Rare  No Urine Cx results in system         Please clarify/confirm the Consultants diagnosis of UTI:    If applicable, please include the diagnosis in your daily progress notes and forward into the discharge summary      [  ] Diagnosis ruled in   [  ] Diagnosis ruled in, but it resolved prior to my assessment of the patient   [ X ] Diagnosis ruled out   [  ] Other diagnosis (please specify): _____________________________   [  ] Clinically undetermined     Present on admission (POA) status:   [   ] Yes (Y)                          [  ] Clinically Undetermined (W)  [   ] No (N)                            [   ] Documentation insufficient to determine if condition is POA (U)

## 2020-07-13 NOTE — CONSULTS
0.4cm incision right pubis area, induration noted.  Packing removed. 0.5cm depth.  Small amount of purlent tan drainage. Patient states swelling has decrease a lot.  Encouraged pt to take a warm shower with antibacterial soap.

## 2020-07-15 LAB
BACTERIA BLD CULT: NORMAL
BACTERIA BLD CULT: NORMAL

## 2020-10-29 NOTE — ED NOTES
PT STATES SHE TRAVELED FROM [place] 3 WEEKS AGO. PT REPORTS BOD ACHES AND ALLERGIES (NASAL CONGESTION).    Pt educated on handwashing and infection control.  Pt verbalized understanding.

## 2022-02-03 ENCOUNTER — HOSPITAL ENCOUNTER (EMERGENCY)
Facility: HOSPITAL | Age: 42
Discharge: HOME | End: 2022-02-03
Attending: EMERGENCY MEDICINE
Payer: MEDICAID

## 2022-02-03 VITALS
HEIGHT: 55 IN | BODY MASS INDEX: 41.66 KG/M2 | OXYGEN SATURATION: 97 % | HEART RATE: 86 BPM | RESPIRATION RATE: 18 BRPM | TEMPERATURE: 98 F | DIASTOLIC BLOOD PRESSURE: 77 MMHG | WEIGHT: 180 LBS | SYSTOLIC BLOOD PRESSURE: 160 MMHG

## 2022-02-03 DIAGNOSIS — S00.83XA FACIAL CONTUSION, INITIAL ENCOUNTER: ICD-10-CM

## 2022-02-03 DIAGNOSIS — S50.11XA CONTUSION OF RIGHT FOREARM, INITIAL ENCOUNTER: ICD-10-CM

## 2022-02-03 DIAGNOSIS — S70.01XA CONTUSION OF RIGHT HIP, INITIAL ENCOUNTER: ICD-10-CM

## 2022-02-03 DIAGNOSIS — S70.12XA CONTUSION OF LEFT THIGH, INITIAL ENCOUNTER: ICD-10-CM

## 2022-02-03 DIAGNOSIS — S20.211A CONTUSION OF RIGHT CHEST WALL, INITIAL ENCOUNTER: ICD-10-CM

## 2022-02-03 DIAGNOSIS — M54.6 THORACIC BACK PAIN: ICD-10-CM

## 2022-02-03 DIAGNOSIS — T74.91XA DOMESTIC VIOLENCE OF ADULT, INITIAL ENCOUNTER: Primary | ICD-10-CM

## 2022-02-03 DIAGNOSIS — T14.90XA TRAUMA: ICD-10-CM

## 2022-02-03 PROCEDURE — 99284 EMERGENCY DEPT VISIT MOD MDM: CPT | Mod: 25

## 2022-02-03 PROCEDURE — 25000003 PHARM REV CODE 250: Performed by: EMERGENCY MEDICINE

## 2022-02-03 RX ORDER — KETOROLAC TROMETHAMINE 10 MG/1
10 TABLET, FILM COATED ORAL
Status: COMPLETED | OUTPATIENT
Start: 2022-02-03 | End: 2022-02-03

## 2022-02-03 RX ORDER — KETOROLAC TROMETHAMINE 10 MG/1
10 TABLET, FILM COATED ORAL EVERY 8 HOURS PRN
Qty: 9 TABLET | Refills: 0 | Status: ON HOLD | OUTPATIENT
Start: 2022-02-03 | End: 2024-02-28 | Stop reason: HOSPADM

## 2022-02-03 RX ORDER — CYCLOBENZAPRINE HCL 10 MG
5 TABLET ORAL 3 TIMES DAILY PRN
Qty: 15 TABLET | Refills: 0 | Status: ON HOLD | OUTPATIENT
Start: 2022-02-03 | End: 2024-02-28 | Stop reason: HOSPADM

## 2022-02-03 RX ADMIN — KETOROLAC TROMETHAMINE 10 MG: 10 TABLET, FILM COATED ORAL at 04:02
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
## 2024-02-23 ENCOUNTER — HOSPITAL ENCOUNTER (INPATIENT)
Facility: HOSPITAL | Age: 44
LOS: 5 days | Discharge: HOME | DRG: 872 | End: 2024-02-28
Attending: EMERGENCY MEDICINE | Admitting: INTERNAL MEDICINE
Payer: MEDICAID

## 2024-02-23 DIAGNOSIS — N12 PYELONEPHRITIS: ICD-10-CM

## 2024-02-23 PROBLEM — N10 ACUTE PYELONEPHRITIS: Status: ACTIVE | Noted: 2024-02-23

## 2024-02-23 LAB
ABS NEUT CALC (OHS): 22.96 X10(3)/MCL (ref 2.1–9.2)
ALBUMIN SERPL-MCNC: 3.8 G/DL (ref 3.5–5)
ALBUMIN/GLOB SERPL: 0.9 RATIO (ref 1.1–2)
ALP SERPL-CCNC: 88 UNIT/L (ref 40–150)
ALT SERPL-CCNC: 34 UNIT/L (ref 0–55)
APPEARANCE UR: CLEAR
AST SERPL-CCNC: 32 UNIT/L (ref 5–34)
B-HCG SERPL QL: NEGATIVE
BACTERIA #/AREA URNS AUTO: ABNORMAL /HPF
BILIRUB SERPL-MCNC: 0.7 MG/DL
BILIRUB UR QL STRIP.AUTO: NEGATIVE
BUN SERPL-MCNC: 11.5 MG/DL (ref 7–18.7)
C TRACH DNA SPEC QL NAA+PROBE: NOT DETECTED
CALCIUM SERPL-MCNC: 9.6 MG/DL (ref 8.4–10.2)
CHLORIDE SERPL-SCNC: 99 MMOL/L (ref 98–107)
CK SERPL-CCNC: 59 U/L (ref 29–168)
CO2 SERPL-SCNC: 23 MMOL/L (ref 22–29)
COLOR UR AUTO: ABNORMAL
CREAT SERPL-MCNC: 1.01 MG/DL (ref 0.55–1.02)
ERYTHROCYTE [DISTWIDTH] IN BLOOD BY AUTOMATED COUNT: 13.3 % (ref 11.5–17)
FLUAV AG UPPER RESP QL IA.RAPID: NOT DETECTED
FLUBV AG UPPER RESP QL IA.RAPID: NOT DETECTED
GFR SERPLBLD CREATININE-BSD FMLA CKD-EPI: >60 MLS/MIN/1.73/M2
GLOBULIN SER-MCNC: 4.2 GM/DL (ref 2.4–3.5)
GLUCOSE SERPL-MCNC: 112 MG/DL (ref 74–100)
GLUCOSE UR QL STRIP.AUTO: 100
HCT VFR BLD AUTO: 41 % (ref 37–47)
HGB BLD-MCNC: 13.4 G/DL (ref 12–16)
KETONES UR QL STRIP.AUTO: ABNORMAL
LACTATE SERPL-SCNC: 1.2 MMOL/L (ref 0.5–2.2)
LACTATE SERPL-SCNC: 2.2 MMOL/L (ref 0.5–2.2)
LEUKOCYTE ESTERASE UR QL STRIP.AUTO: ABNORMAL
LYMPHOCYTES NFR BLD MANUAL: 1.6 X10(3)/MCL
LYMPHOCYTES NFR BLD MANUAL: 6 % (ref 13–40)
MCH RBC QN AUTO: 29.3 PG (ref 27–31)
MCHC RBC AUTO-ENTMCNC: 32.7 G/DL (ref 33–36)
MCV RBC AUTO: 89.5 FL (ref 80–94)
MONOCYTES NFR BLD MANUAL: 2.14 X10(3)/MCL (ref 0.1–1.3)
MONOCYTES NFR BLD MANUAL: 8 % (ref 2–11)
MUCOUS THREADS URNS QL MICRO: ABNORMAL /LPF
N GONORRHOEA DNA SPEC QL NAA+PROBE: NOT DETECTED
NEUTROPHILS NFR BLD MANUAL: 84 % (ref 47–80)
NEUTS BAND NFR BLD MANUAL: 2 % (ref 0–11)
NITRITE UR QL STRIP.AUTO: POSITIVE
NRBC BLD AUTO-RTO: 0 %
PH UR STRIP.AUTO: 7.5 [PH]
PLATELET # BLD AUTO: 306 X10(3)/MCL (ref 130–400)
PLATELET # BLD EST: ADEQUATE 10*3/UL
PMV BLD AUTO: 11.3 FL (ref 7.4–10.4)
POTASSIUM SERPL-SCNC: 3.5 MMOL/L (ref 3.5–5.1)
PROT SERPL-MCNC: 8 GM/DL (ref 6.4–8.3)
PROT UR QL STRIP.AUTO: 100
RBC # BLD AUTO: 4.58 X10(6)/MCL (ref 4.2–5.4)
RBC #/AREA URNS AUTO: ABNORMAL /HPF
RBC MORPH BLD: NORMAL
RBC UR QL AUTO: ABNORMAL
RSV A 5' UTR RNA NPH QL NAA+PROBE: NOT DETECTED
SARS-COV-2 RNA RESP QL NAA+PROBE: NOT DETECTED
SODIUM SERPL-SCNC: 132 MMOL/L (ref 136–145)
SOURCE (OHS): NORMAL
SP GR UR STRIP.AUTO: 1.02 (ref 1–1.03)
SQUAMOUS #/AREA URNS AUTO: ABNORMAL /HPF
UROBILINOGEN UR STRIP-ACNC: 4
WBC # SPEC AUTO: 26.7 X10(3)/MCL (ref 4.5–11.5)
WBC #/AREA URNS AUTO: ABNORMAL /HPF

## 2024-02-23 PROCEDURE — 96374 THER/PROPH/DIAG INJ IV PUSH: CPT

## 2024-02-23 PROCEDURE — 82550 ASSAY OF CK (CPK): CPT | Performed by: EMERGENCY MEDICINE

## 2024-02-23 PROCEDURE — 25000003 PHARM REV CODE 250: Performed by: EMERGENCY MEDICINE

## 2024-02-23 PROCEDURE — 80053 COMPREHEN METABOLIC PANEL: CPT | Performed by: PHYSICIAN ASSISTANT

## 2024-02-23 PROCEDURE — 11000001 HC ACUTE MED/SURG PRIVATE ROOM

## 2024-02-23 PROCEDURE — 87077 CULTURE AEROBIC IDENTIFY: CPT | Performed by: EMERGENCY MEDICINE

## 2024-02-23 PROCEDURE — 96375 TX/PRO/DX INJ NEW DRUG ADDON: CPT

## 2024-02-23 PROCEDURE — 0241U COVID/RSV/FLU A&B PCR: CPT | Performed by: EMERGENCY MEDICINE

## 2024-02-23 PROCEDURE — 87491 CHLMYD TRACH DNA AMP PROBE: CPT | Performed by: EMERGENCY MEDICINE

## 2024-02-23 PROCEDURE — 81003 URINALYSIS AUTO W/O SCOPE: CPT | Performed by: PHYSICIAN ASSISTANT

## 2024-02-23 PROCEDURE — 87086 URINE CULTURE/COLONY COUNT: CPT | Performed by: PHYSICIAN ASSISTANT

## 2024-02-23 PROCEDURE — 85027 COMPLETE CBC AUTOMATED: CPT | Performed by: PHYSICIAN ASSISTANT

## 2024-02-23 PROCEDURE — 25000003 PHARM REV CODE 250: Performed by: INTERNAL MEDICINE

## 2024-02-23 PROCEDURE — 25500020 PHARM REV CODE 255: Performed by: EMERGENCY MEDICINE

## 2024-02-23 PROCEDURE — 96361 HYDRATE IV INFUSION ADD-ON: CPT

## 2024-02-23 PROCEDURE — 21400001 HC TELEMETRY ROOM

## 2024-02-23 PROCEDURE — 63600175 PHARM REV CODE 636 W HCPCS: Performed by: EMERGENCY MEDICINE

## 2024-02-23 PROCEDURE — 99285 EMERGENCY DEPT VISIT HI MDM: CPT | Mod: 25

## 2024-02-23 PROCEDURE — 83605 ASSAY OF LACTIC ACID: CPT | Performed by: EMERGENCY MEDICINE

## 2024-02-23 PROCEDURE — 81025 URINE PREGNANCY TEST: CPT | Performed by: PHYSICIAN ASSISTANT

## 2024-02-23 PROCEDURE — 87154 CUL TYP ID BLD PTHGN 6+ TRGT: CPT | Performed by: EMERGENCY MEDICINE

## 2024-02-23 RX ORDER — SODIUM CHLORIDE 9 MG/ML
1000 INJECTION, SOLUTION INTRAVENOUS
Status: COMPLETED | OUTPATIENT
Start: 2024-02-23 | End: 2024-02-23

## 2024-02-23 RX ORDER — SODIUM CHLORIDE 9 MG/ML
INJECTION, SOLUTION INTRAVENOUS CONTINUOUS
Status: ACTIVE | OUTPATIENT
Start: 2024-02-23 | End: 2024-02-24

## 2024-02-23 RX ORDER — KETOROLAC TROMETHAMINE 30 MG/ML
15 INJECTION, SOLUTION INTRAMUSCULAR; INTRAVENOUS EVERY 6 HOURS PRN
Status: DISPENSED | OUTPATIENT
Start: 2024-02-23 | End: 2024-02-26

## 2024-02-23 RX ORDER — BENAZEPRIL HYDROCHLORIDE AND HYDROCHLOROTHIAZIDE 20; 12.5 MG/1; MG/1
1 TABLET ORAL DAILY
Status: DISCONTINUED | OUTPATIENT
Start: 2024-02-24 | End: 2024-02-23 | Stop reason: CLARIF

## 2024-02-23 RX ORDER — ENOXAPARIN SODIUM 100 MG/ML
40 INJECTION SUBCUTANEOUS EVERY 24 HOURS
Status: DISCONTINUED | OUTPATIENT
Start: 2024-02-24 | End: 2024-02-28 | Stop reason: HOSPADM

## 2024-02-23 RX ORDER — HYDROCHLOROTHIAZIDE 12.5 MG/1
12.5 TABLET ORAL DAILY
Status: DISCONTINUED | OUTPATIENT
Start: 2024-02-24 | End: 2024-02-24

## 2024-02-23 RX ORDER — ACETAMINOPHEN 325 MG/1
650 TABLET ORAL EVERY 6 HOURS PRN
Status: DISCONTINUED | OUTPATIENT
Start: 2024-02-23 | End: 2024-02-24

## 2024-02-23 RX ORDER — LISINOPRIL 20 MG/1
20 TABLET ORAL DAILY
Status: DISCONTINUED | OUTPATIENT
Start: 2024-02-24 | End: 2024-02-26

## 2024-02-23 RX ORDER — KETOROLAC TROMETHAMINE 30 MG/ML
15 INJECTION, SOLUTION INTRAMUSCULAR; INTRAVENOUS
Status: COMPLETED | OUTPATIENT
Start: 2024-02-23 | End: 2024-02-23

## 2024-02-23 RX ADMIN — KETOROLAC TROMETHAMINE 15 MG: 30 INJECTION, SOLUTION INTRAMUSCULAR at 05:02

## 2024-02-23 RX ADMIN — SODIUM CHLORIDE: 9 INJECTION, SOLUTION INTRAVENOUS at 08:02

## 2024-02-23 RX ADMIN — CEFTRIAXONE SODIUM 1 G: 1 INJECTION, POWDER, FOR SOLUTION INTRAMUSCULAR; INTRAVENOUS at 05:02

## 2024-02-23 RX ADMIN — SODIUM CHLORIDE 1000 ML: 9 INJECTION, SOLUTION INTRAVENOUS at 03:02

## 2024-02-23 RX ADMIN — IOPAMIDOL 100 ML: 612 INJECTION, SOLUTION INTRAVENOUS at 05:02

## 2024-02-23 NOTE — ED PROVIDER NOTES
Encounter Date: 2/23/2024       History     Chief Complaint   Patient presents with    Flank Pain     c/o back and flank pain X 2 days     Patient is a 43 yo F presenting with flank pain x 2 days. She also has subjective fevers, R flank pain, and dysuria. No vaginal bleeding or discharge. She is in a monogamous relationship with her  and denies any exposures to Stds that she is aware of. She has chills. No cough. No vomiting. No diarrhea. She does report regular meth use.        Review of patient's allergies indicates:  No Known Allergies  Past Medical History:   Diagnosis Date    Anxiety     Depression     Hypertension      Past Surgical History:   Procedure Laterality Date    CHOLECYSTECTOMY      TUBAL LIGATION      TYMPANOSTOMY TUBE PLACEMENT       No family history on file.  Social History     Tobacco Use    Smoking status: Never     Review of Systems   Constitutional:  Negative for fever.   HENT:  Negative for sore throat.    Respiratory:  Negative for shortness of breath.    Cardiovascular:  Negative for chest pain.   Gastrointestinal:  Negative for nausea.   Genitourinary:  Positive for dysuria and flank pain.   Musculoskeletal:  Negative for back pain.   Skin:  Negative for rash.   Neurological:  Negative for weakness.   Hematological:  Does not bruise/bleed easily.       Physical Exam     Initial Vitals [02/23/24 1436]   BP Pulse Resp Temp SpO2   (!) 170/86 101 18 99.9 °F (37.7 °C) 98 %      MAP       --         Physical Exam    Nursing note and vitals reviewed.  Constitutional: She appears well-developed and well-nourished. No distress.   HENT:   Head: Normocephalic and atraumatic.   Eyes: Conjunctivae are normal. Pupils are equal, round, and reactive to light.   Neck: Neck supple.   Cardiovascular:  Normal rate, regular rhythm and normal heart sounds.           No murmur heard.  Pulmonary/Chest: Breath sounds normal. No respiratory distress. She has no wheezes. She has no rhonchi.   Abdominal:  Abdomen is soft. Bowel sounds are normal. She exhibits no distension. There is no abdominal tenderness.   + R CVAT There is no rebound and no guarding.   Musculoskeletal:         General: No edema. Normal range of motion.      Cervical back: Neck supple.     Neurological: She is alert and oriented to person, place, and time.   Skin: Skin is warm and dry.   Psychiatric: She has a normal mood and affect. Thought content normal.         ED Course   Procedures  Labs Reviewed   COMPREHENSIVE METABOLIC PANEL - Abnormal; Notable for the following components:       Result Value    Sodium Level 132 (*)     Glucose Level 112 (*)     Globulin 4.2 (*)     Albumin/Globulin Ratio 0.9 (*)     All other components within normal limits   URINALYSIS, REFLEX TO URINE CULTURE - Abnormal; Notable for the following components:    Color, UA Orange (*)     Protein,  (*)     Glucose,  (*)     Ketones, UA Trace (*)     Blood, UA Small (*)     Urobilinogen, UA 4.0 (*)     Nitrites, UA Positive (*)     Leukocyte Esterase, UA Small (*)     All other components within normal limits   CBC WITH DIFFERENTIAL - Abnormal; Notable for the following components:    WBC 26.70 (*)     MCHC 32.7 (*)     MPV 11.3 (*)     All other components within normal limits   URINALYSIS, MICROSCOPIC - Abnormal; Notable for the following components:    Bacteria, UA Few (*)     Mucous, UA Trace (*)     WBC, UA 21-50 (*)     Squamous Epithelial Cells, UA Moderate (*)     All other components within normal limits   MANUAL DIFFERENTIAL - Abnormal; Notable for the following components:    Neutrophils % 84 (*)     Lymphs % 6 (*)     Neutrophils Abs Calc 22.962 (*)     Monocytes Abs 2.136 (*)     All other components within normal limits   PREGNANCY TEST, URINE RAPID - Normal   COVID/RSV/FLU A&B PCR - Normal    Narrative:     The Xpert Xpress SARS-CoV-2/FLU/RSV plus is a rapid, multiplexed real-time PCR test intended for the simultaneous qualitative detection and  differentiation of SARS-CoV-2, Influenza A, Influenza B, and respiratory syncytial virus (RSV) viral RNA in either nasopharyngeal swab or nasal swab specimens.         LACTIC ACID, PLASMA - Normal   CK - Normal   LACTIC ACID, PLASMA - Normal   CHLAMYDIA/GONORRHOEAE(GC), PCR    Narrative:     The Xpert CT/NG test, performed on the GeneXpert system is a qualitative in vitro real-time polymerase chain reaction (PCR) test for the automated detected and differentiation for genomic DNA from Chlamydia trachomatis (CT) and/or Neisseria gonorrhoeae (NG).   CBC W/ AUTO DIFFERENTIAL    Narrative:     The following orders were created for panel order CBC auto differential.  Procedure                               Abnormality         Status                     ---------                               -----------         ------                     CBC with Differential[[id number]]       Abnormal            Final result               Manual Differential[[id number]]         Abnormal            Final result                 Please view results for these tests on the individual orders.          Imaging Results              CT Abdomen Pelvis With IV Contrast NO Oral Contrast (Final result)  Result time 02/23/24 17:15:23      Final result by Lesa Parker MD (02/23/24 17:15:23)                   Impression:      Findings seen consistent with lobar nephronia/pyelonephritis in the right kidney      Electronically signed by: Cortez Parker  Date:    02/23/2024  Time:    17:15               Narrative:    EXAMINATION:  CT ABDOMEN PELVIS WITH IV CONTRAST    CLINICAL HISTORY:  UTI, recurrent/complicated (Female);    TECHNIQUE:  Low dose axial images, sagittal and coronal reformations were obtained from the lung bases to the pubic symphysis following the IV administration of contrast. Automatic exposure control (AEC) is utilized to reduce patient radiation exposure.    COMPARISON:  None.    FINDINGS:  The lung bases are clear.    The  liver appears normal.  No liver mass or lesion is seen.  Portal and hepatic veins appear normal.    The patient is status post cholecystectomy.    The pancreas appears normal.  No pancreatic mass or lesion is seen.    The spleen shows no acute abnormality.    The adrenal glands appear normal.  No adrenal nodule is seen.    There is perinephric stranding around the right kidney and there is heterogeneous enhancement in the upper pole of the right kidney consistent with lobar nephronia/pyelonephritis.  No hydronephrosis is seen.  No hydroureter is seen.  No nephrolithiasis is seen.  No obvious ureteral stones are seen.    Urinary bladder appears grossly unremarkable.    No colitis is seen.  No diverticulitis is seen.  No obvious colonic mass or lesion is seen.    No free air is seen.  No free fluid is seen.                                       Medications   cefTRIAXone (Rocephin) 1 g in dextrose 5 % in water (D5W) 100 mL IVPB (MB+) (0 g Intravenous Stopped 2/23/24 1741)   hydroCHLOROthiazide tablet 12.5 mg (has no administration in time range)     And   lisinopriL tablet 20 mg (has no administration in time range)   ketorolac injection 15 mg (has no administration in time range)   acetaminophen tablet 650 mg (650 mg Oral Given 2/24/24 0357)   0.9%  NaCl infusion ( Intravenous New Bag 2/24/24 0705)   enoxaparin injection 40 mg (has no administration in time range)   mupirocin 2 % ointment (has no administration in time range)   ondansetron injection 4 mg (has no administration in time range)   0.9%  NaCl infusion (0 mLs Intravenous Stopped 2/23/24 1713)   ketorolac injection 15 mg (15 mg Intravenous Given 2/23/24 1711)   iopamidoL (ISOVUE-300) injection 100 mL (100 mLs Intravenous Given 2/23/24 1700)     Medical Decision Making  Patient with pyelonephritis and significant leukocytosis. Borderline febrile with mild tachycardia that improved with fluids. Lactic acid normal. Ceftriaxone ordered. Plan to admit. Patient  comfortable with plan.     Problems Addressed:  Pyelonephritis: acute illness or injury    Amount and/or Complexity of Data Reviewed  Labs: ordered. Decision-making details documented in ED Course.  Radiology: ordered. Decision-making details documented in ED Course.  Discussion of management or test interpretation with external provider(s): Discussed with hospitalist.    Risk  Prescription drug management.  Decision regarding hospitalization.               ED Course as of 02/24/24 0838   Fri Feb 23, 2024   1722 CT shows pyelonphritis [GM]   1740 Admit to hospitalist [GM]      ED Course User Index  [GM] Jessica Moreno MD                           Clinical Impression:  Final diagnoses:  [N12] Pyelonephritis          ED Disposition Condition    Admit Stable                Jessica Moreno MD  02/24/24 3793

## 2024-02-24 LAB
ACINETOBACTER CALCOACETICUS-BAUMANNII COMPLEX (OHS): NOT DETECTED
BACTEROIDES FRAGILIS (OHS): NOT DETECTED
C AURIS DNA BLD POS QL NAA+NON-PROBE: NOT DETECTED
C GATTII+NEOFOR DNA CSF QL NAA+NON-PROBE: NOT DETECTED
CANDIDA ALBICANS (OHS): NOT DETECTED
CANDIDA GLABRATA (OHS): NOT DETECTED
CANDIDA KRUSEI (OHS): NOT DETECTED
CANDIDA PARAPSILOSIS (OHS): NOT DETECTED
CANDIDA TROPICALIS (OHS): NOT DETECTED
CTX-M (OHS): NOT DETECTED
ENTEROBACTER CLOACAE COMPLEX (OHS): NOT DETECTED
ENTEROBACTERALES (OHS): DETECTED
ENTEROCOCCUS FAECALIS (OHS): NOT DETECTED
ENTEROCOCCUS FAECIUM (OHS): NOT DETECTED
ESCHERICHIA COLI (OHS): DETECTED
GP B STREP DNA CSF QL NAA+NON-PROBE: NOT DETECTED
HAEM INFLU DNA CSF QL NAA+NON-PROBE: NOT DETECTED
IMP (OHS): NOT DETECTED
KLEBSIELLA AEROGENES (OHS): NOT DETECTED
KLEBSIELLA OXYTOCA (OHS): NOT DETECTED
KLEBSIELLA PNEUMONIAE GROUP (OHS): NOT DETECTED
KPC (OHS): NOT DETECTED
L MONOCYTOG DNA CSF QL NAA+NON-PROBE: NOT DETECTED
MCR-1 (OHS): NOT DETECTED
MECA/C (OHS): ABNORMAL
MECA/C AND MREJ (MRSA)(OHS): ABNORMAL
N MEN DNA CSF QL NAA+NON-PROBE: NOT DETECTED
NDM (OHS): NOT DETECTED
OXA-48-LIKE (OHS): NOT DETECTED
PROTEUS SPP. (OHS): NOT DETECTED
PSEUDOMONAS AERUGINOSA (OHS): NOT DETECTED
S ENT+BONG DNA STL QL NAA+NON-PROBE: NOT DETECTED
S PNEUM DNA CSF QL NAA+NON-PROBE: NOT DETECTED
SERRATIA MARCESCENS (OHS): NOT DETECTED
STAPHYLOCOCCUS AUREUS (OHS): NOT DETECTED
STAPHYLOCOCCUS EPIDERMIDIS (OHS): NOT DETECTED
STAPHYLOCOCCUS LUGDUNENSIS (OHS): NOT DETECTED
STAPHYLOCOCCUS SPP. (OHS): NOT DETECTED
STENOTROPHOMONAS MALTOPHILIA (OHS): NOT DETECTED
STREPTOCOCCUS PYOGENES (GROUP A)(OHS): NOT DETECTED
STREPTOCOCCUS SPP. (OHS): NOT DETECTED
VANA/B (OHS): ABNORMAL
VIM (OHS): NOT DETECTED

## 2024-02-24 PROCEDURE — 25000003 PHARM REV CODE 250: Performed by: INTERNAL MEDICINE

## 2024-02-24 PROCEDURE — 63600175 PHARM REV CODE 636 W HCPCS: Performed by: INTERNAL MEDICINE

## 2024-02-24 PROCEDURE — 21400001 HC TELEMETRY ROOM

## 2024-02-24 RX ORDER — PRAMIPEXOLE DIHYDROCHLORIDE 0.25 MG/1
0.5 TABLET ORAL NIGHTLY
Status: DISCONTINUED | OUTPATIENT
Start: 2024-02-24 | End: 2024-02-28 | Stop reason: HOSPADM

## 2024-02-24 RX ORDER — ACETAMINOPHEN 325 MG/1
650 TABLET ORAL EVERY 4 HOURS PRN
Status: DISCONTINUED | OUTPATIENT
Start: 2024-02-24 | End: 2024-02-28 | Stop reason: HOSPADM

## 2024-02-24 RX ORDER — BUPROPION HYDROCHLORIDE 150 MG/1
150 TABLET ORAL DAILY
Status: DISCONTINUED | OUTPATIENT
Start: 2024-02-24 | End: 2024-02-28 | Stop reason: HOSPADM

## 2024-02-24 RX ORDER — BUTALBITAL, ACETAMINOPHEN AND CAFFEINE 50; 325; 40 MG/1; MG/1; MG/1
1 TABLET ORAL EVERY 4 HOURS PRN
Status: DISCONTINUED | OUTPATIENT
Start: 2024-02-24 | End: 2024-02-28 | Stop reason: HOSPADM

## 2024-02-24 RX ORDER — LISINOPRIL 10 MG/1
10 TABLET ORAL DAILY
Status: DISCONTINUED | OUTPATIENT
Start: 2024-02-24 | End: 2024-02-24

## 2024-02-24 RX ORDER — MUPIROCIN 20 MG/G
OINTMENT TOPICAL 2 TIMES DAILY
Status: DISCONTINUED | OUTPATIENT
Start: 2024-02-24 | End: 2024-02-28 | Stop reason: HOSPADM

## 2024-02-24 RX ORDER — ONDANSETRON HYDROCHLORIDE 2 MG/ML
4 INJECTION, SOLUTION INTRAVENOUS EVERY 8 HOURS PRN
Status: DISCONTINUED | OUTPATIENT
Start: 2024-02-24 | End: 2024-02-28 | Stop reason: HOSPADM

## 2024-02-24 RX ORDER — SERTRALINE HYDROCHLORIDE 50 MG/1
100 TABLET, FILM COATED ORAL DAILY
Status: DISCONTINUED | OUTPATIENT
Start: 2024-02-24 | End: 2024-02-28 | Stop reason: HOSPADM

## 2024-02-24 RX ADMIN — MUPIROCIN: 20 OINTMENT TOPICAL at 08:02

## 2024-02-24 RX ADMIN — KETOROLAC TROMETHAMINE 15 MG: 30 INJECTION, SOLUTION INTRAMUSCULAR; INTRAVENOUS at 11:02

## 2024-02-24 RX ADMIN — MUPIROCIN: 20 OINTMENT TOPICAL at 09:02

## 2024-02-24 RX ADMIN — CEFEPIME 1 G: 1 INJECTION, POWDER, FOR SOLUTION INTRAMUSCULAR; INTRAVENOUS at 10:02

## 2024-02-24 RX ADMIN — BUPROPION HYDROCHLORIDE 150 MG: 150 TABLET, FILM COATED, EXTENDED RELEASE ORAL at 01:02

## 2024-02-24 RX ADMIN — PRAMIPEXOLE DIHYDROCHLORIDE 0.5 MG: 0.25 TABLET ORAL at 08:02

## 2024-02-24 RX ADMIN — SODIUM CHLORIDE: 9 INJECTION, SOLUTION INTRAVENOUS at 05:02

## 2024-02-24 RX ADMIN — KETOROLAC TROMETHAMINE 15 MG: 30 INJECTION, SOLUTION INTRAMUSCULAR; INTRAVENOUS at 07:02

## 2024-02-24 RX ADMIN — BUTALBITAL, ACETAMINOPHEN, AND CAFFEINE 1 TABLET: 50; 325; 40 TABLET ORAL at 05:02

## 2024-02-24 RX ADMIN — LISINOPRIL 20 MG: 20 TABLET ORAL at 09:02

## 2024-02-24 RX ADMIN — BUTALBITAL, ACETAMINOPHEN, AND CAFFEINE 1 TABLET: 50; 325; 40 TABLET ORAL at 12:02

## 2024-02-24 RX ADMIN — SODIUM CHLORIDE: 9 INJECTION, SOLUTION INTRAVENOUS at 07:02

## 2024-02-24 RX ADMIN — ACETAMINOPHEN 650 MG: 325 TABLET, FILM COATED ORAL at 03:02

## 2024-02-24 RX ADMIN — ENOXAPARIN SODIUM 40 MG: 40 INJECTION SUBCUTANEOUS at 04:02

## 2024-02-24 RX ADMIN — HYDROCHLOROTHIAZIDE 12.5 MG: 12.5 TABLET ORAL at 09:02

## 2024-02-24 RX ADMIN — SERTRALINE HYDROCHLORIDE 100 MG: 50 TABLET ORAL at 01:02

## 2024-02-24 RX ADMIN — CEFEPIME 1 G: 1 INJECTION, POWDER, FOR SOLUTION INTRAMUSCULAR; INTRAVENOUS at 05:02

## 2024-02-24 NOTE — NURSING
Nurses Note -- 4 Eyes      2/24/2024   1:02 AM      Skin assessed during: Admit      [x] No Altered Skin Integrity Present    [x]Prevention Measures Documented      [] Yes- Altered Skin Integrity Present or Discovered   [] LDA Added if Not in Epic (Describe Wound)   [] New Altered Skin Integrity was Present on Admit and Documented in LDA   [] Wound Image Taken    Wound Care Consulted? No    Attending Nurse:  Nestor Casiano RN/Staff Member:   Lexii Gillespie

## 2024-02-24 NOTE — H&P
[facility] Hospital Medicine History & Physical Examination       Patient Name: Hair Gil  MRN: [id number]  Patient Class: IP- Inpatient   Admission Date: 02/24/2024   Admitting Service: Hospital Medicine   Length of Stay: 1  Attending Physician: Clary Velez MD  Primary Care Provider: Sg Melendez III, MD  Face-to-Face encounter date: 02/24/2024  Code Status: Full  Chief Complaint: Flank Pain (c/o back and flank pain X 2 days)      Patient information was obtained from patient, patient's family, past medical records and ER records.      HISTORY OF PRESENT ILLNESS:   Hair Gil is a 44 y.o. female with a PMHx of HTN, anxiety/depression who presented to [facility] orthopedic ED on 2/23/2024 with c/o right flank pain x2 days.  Pain described as sharp and radiates to her back with associated dysuria, fever, chills.  Denied any nausea or vomiting.  Labs notable for WBC 26.7, sodium 132, glucose 112.  Lactic acid normal.  HCG negative.  Influenza, RSV, COVID-19 negative.  Urinalysis with 100 protein, 100 glucose, trace ketones, small blood, positive nitrites, small leukocyte esterase, 21-50 WBCs, few bacteria, moderate squamous epithelial cells and trace mucus.  Urine chlamydia and gonorrhea PCR is negative.  Blood cultures x2 obtained and urine culture pending.  CT abdomen and pelvis with IV contrast consistent with lobar nephronia/pyelonephritis in the right kidney.  She was started on IV ceftriaxone.  Admitted to hospital medicine services and transferred to [facility] for further medical management.    Urine culture noted to be growing Gram-negative rods.  Blood cultures x2 growing Gram-negative rods.    REVIEW OF SYSTEMS:   Except as documented, all other systems reviewed and negative     PAST MEDICAL HISTORY:   Essential hypertension   Anxiety/depression    PAST SURGICAL HISTORY:     Past Surgical History:   Procedure Laterality Date    CHOLECYSTECTOMY      TUBAL LIGATION       TYMPANOSTOMY TUBE PLACEMENT         FAMILY HISTORY:   Reviewed and negative    SOCIAL HISTORY:   Denied alcohol, tobacco or illicit drug use    ALLERGIES:   Patient has no known allergies.    HOME MEDICATIONS:     Prior to Admission medications    Medication Sig Start Date End Date Taking? Authorizing Provider   acetaminophen (TYLENOL) 650 MG TbSR Take 1 tablet (650 mg total) by mouth every 8 (eight) hours. 4/9/20   Rajeev Boateng MD   benazepril-hydrochlorthiazide (LOTENSIN HCT) 20-12.5 mg per tablet Take 1 tablet by mouth once daily.    Provider, Historical   buPROPion (WELLBUTRIN XL) 150 MG TB24 tablet Take 150 mg by mouth once daily.    Provider, Historical   cyclobenzaprine (FLEXERIL) 10 MG tablet Take 0.5 tablets (5 mg total) by mouth 3 (three) times daily as needed for Muscle spasms. 2/3/22   Grant Jennings Jr., MD   diclofenac (VOLTAREN) 50 MG EC tablet Take 1 tablet (50 mg total) by mouth 3 (three) times daily as needed. 3/10/20   Raisa Back NP   ketorolac (TORADOL) 10 mg tablet Take 1 tablet (10 mg total) by mouth every 8 (eight) hours as needed for Pain. 2/3/22   Grant Jennings Jr., MD   pramipexole (MIRAPEX) 0.25 MG tablet Take 0.5 mg by mouth every evening.    Provider, Historical   sertraline (ZOLOFT) 100 MG tablet Take 100 mg by mouth once daily.    Provider, Historical     ________________________________________________________________________  INPATIENT LIST OF MEDICATIONS     Current Facility-Administered Medications:     0.9%  NaCl infusion, , Intravenous, Continuous, Kalani Way MD, Last Rate: 150 mL/hr at 02/24/24 0705, New Bag at 02/24/24 0705    acetaminophen tablet 650 mg, 650 mg, Oral, Q6H PRN, Kalani Way MD, 650 mg at 02/24/24 0357    cefTRIAXone (Rocephin) 1 g in dextrose 5 % in water (D5W) 100 mL IVPB (MB+), 1 g, Intravenous, Q24H, Kalani Way MD, Stopped at 02/23/24 1741    enoxaparin injection 40 mg, 40 mg, Subcutaneous, Q24H (prophylaxis, 1700), Kalani Way,  MD    hydroCHLOROthiazide tablet 12.5 mg, 12.5 mg, Oral, Daily **AND** lisinopriL tablet 20 mg, 20 mg, Oral, Daily, Radhika De Leon MD    ketorolac injection 15 mg, 15 mg, Intravenous, Q6H PRN, Kalani Way MD    mupirocin 2 % ointment, , Nasal, BID, Radhika De Leon MD    ondansetron injection 4 mg, 4 mg, Intravenous, Q8H PRN, Jumana Gamboa, [facility]    Scheduled Meds:   cefTRIAXone (Rocephin) IV (PEDS and ADULTS)  1 g Intravenous Q24H    enoxparin  40 mg Subcutaneous Q24H (prophylaxis, 1700)    hydroCHLOROthiazide  12.5 mg Oral Daily    And    lisinopriL  20 mg Oral Daily    mupirocin   Nasal BID     Continuous Infusions:   sodium chloride 0.9% 150 mL/hr at 02/24/24 0705     PRN Meds:.acetaminophen, ketorolac, ondansetron    PHYSICAL EXAM:     VITAL SIGNS: 24 HRS MIN & MAX LAST   Temp  Min: 98.9 °F (37.2 °C)  Max: 101 °F (38.3 °C) (!) 101 °F (38.3 °C)   BP  Min: 150/84  Max: 170/72 (!) 169/82   Pulse  Min: 90  Max: 105  98   Resp  Min: 18  Max: 20 18   SpO2  Min: 98 %  Max: 100 % 100 %       General appearance: Well-developed female in no apparent distress.  HENT: Atraumatic head. Moist mucous membranes of oral cavity.  Eyes: Normal extraocular movements.   Neck: Supple.   Lungs: Clear to auscultation bilaterally.   Heart: Regular rate and rhythm. S1 and S2 present. No pedal edema.  Abdomen: Soft, non-distended, non-tender.  Right flank tenderness  Extremities: No cyanosis, clubbing  Skin: No Rash.   Neuro: Motor and sensory exams grossly intact.   Psych/mental status: Appropriate mood and affect. Responds appropriately to questions.     LABS AND IMAGING:     Recent Labs   Lab 02/23/24  1527   WBC 26.70*   RBC 4.58   HGB 13.4   HCT 41.0   MCV 89.5   MCH 29.3   MCHC 32.7*   RDW 13.3      MPV 11.3*       Recent Labs   Lab 02/23/24  1527   *   K 3.5   CO2 23   BUN 11.5   CREATININE 1.01   CALCIUM 9.6   ALBUMIN 3.8   ALKPHOS 88   ALT 34   AST 32   BILITOT 0.7       Microbiology Results (last 7  days)       Procedure Component Value Units Date/Time    Urine culture [[id number]]  (Abnormal) Collected: 02/23/24 1527    Order Status: Completed Specimen: Urine Updated: 02/24/24 0733     Urine Culture >/= 100,000 colonies/ml Gram-negative Rods    Blood Culture #2 **CANNOT BE ORDERED STAT** [[id number]]  (Abnormal) Collected: 02/23/24 1636    Order Status: Completed Specimen: Blood from Forearm, Right Updated: 02/24/24 0710     GRAM STAIN Gram Negative Rods      Seen in gram stain of broth only      2 of 2 bottles positive    Blood Culture #1 **CANNOT BE ORDERED STAT** [[id number]]  (Abnormal) Collected: 02/23/24 1624    Order Status: Completed Specimen: Blood from Antecubital, Right Updated: 02/24/24 0710     GRAM STAIN Gram Negative Rods      Seen in gram stain of broth only      2 of 2 bottles positive    BCID2 Panel [[id number]] Collected: 02/23/24 1624    Order Status: Sent Specimen: Blood from Antecubital, Right Updated: 02/24/24 0656    Chlamydia/GC, PCR [[id number]] Collected: 02/23/24 1527    Order Status: Completed Specimen: Urine Updated: 02/23/24 2030     Chlamydia trachomatis PCR Not Detected     N. gonorrhea PCR Not Detected     Source Urine    Narrative:      The Xpert CT/NG test, performed on the GeneXpert system is a qualitative in vitro real-time polymerase chain reaction (PCR) test for the automated detected and differentiation for genomic DNA from Chlamydia trachomatis (CT) and/or Neisseria gonorrhoeae (NG).             CT Abdomen Pelvis With IV Contrast NO Oral Contrast  Narrative: EXAMINATION:  CT ABDOMEN PELVIS WITH IV CONTRAST    CLINICAL HISTORY:  UTI, recurrent/complicated (Female);    TECHNIQUE:  Low dose axial images, sagittal and coronal reformations were obtained from the lung bases to the pubic symphysis following the IV administration of contrast. Automatic exposure control (AEC) is utilized to reduce patient radiation exposure.    COMPARISON:  None.    FINDINGS:  The lung bases  are clear.    The liver appears normal.  No liver mass or lesion is seen.  Portal and hepatic veins appear normal.    The patient is status post cholecystectomy.    The pancreas appears normal.  No pancreatic mass or lesion is seen.    The spleen shows no acute abnormality.    The adrenal glands appear normal.  No adrenal nodule is seen.    There is perinephric stranding around the right kidney and there is heterogeneous enhancement in the upper pole of the right kidney consistent with lobar nephronia/pyelonephritis.  No hydronephrosis is seen.  No hydroureter is seen.  No nephrolithiasis is seen.  No obvious ureteral stones are seen.    Urinary bladder appears grossly unremarkable.    No colitis is seen.  No diverticulitis is seen.  No obvious colonic mass or lesion is seen.    No free air is seen.  No free fluid is seen.  Impression: Findings seen consistent with lobar nephronia/pyelonephritis in the right kidney    Electronically signed by: Cortez Parker  Date:    02/23/2024  Time:    17:15        ASSESSMENT & PLAN:     Sepsis secondary to Acute right lobar nephronia/pyelonephritis   Leukocytosis  Essential hypertension   Anxiety/depression     Plan:  Continue IV ceftriaxone daily  Follow blood and urine cultures   Resume appropriate home medications once updated  Labs in a.m.      VTE Prophylaxis: Lovenox    Discharge Planning and Disposition: TBD    I, Jumana Gamboa, CODY have reviewed and discussed the case with Clary Velez MD  Please see the attending MD's addendum for further assessment and plan.    Jumana Gamboa, AGACNP-BC  Department of Hospital Medicine   [facility]   02/24/2024    _______________________________________________________________________________  MD Addendum:  I, Dr. Velez assumed care of this patient today at around 1030 am  For the patient encounter, I performed the substantive portion of the visit, I reviewed the NP/PA documentation, treatment plan,  and medical decision making.  I had face to face time with this patient     A. History:      44-year-old female with significant history of substance use-amphetamine, anxiety/depression, HTN presented to [facility] ED with complaints of right flank pain for the past 2 days with associated fever, chills and dysuria.  Febrile in the ED. lab significant for severe leukocytosis.  UA indicative of UTI.  Blood cultures and urine cultures corrected.  [facility] lobar neph[facility]ia/pyelonephritis right kidney. Patient received ceftriaxone in the ED and was transferred to our hospital for admission under hospitalist medicine service.  Patient also complaining of headache which is chronic, but with acute worsening    B. Physical exam:  Agree with above    C. Medical decision making:    Acute bacterial UTI with right-sided pyelonephritis/lobar nephronia  Sepsis secondary to above  High-grade bacteremia-GNR secondary to above  Mild hyponatremia  Acute on chronic head ache  History of substance use-amphetamine use   History of anxiety/depression  History of essential HTN with occasional acceleration  Prophylaxis    Urine cultures/blood cultures-GNR   BC ID panel with Enterobacter/E coli  Fourth generation cephalosporin-cefepime expected to cover both and therefore I have switched her IV ceftriaxone to cefepime  Significant leukocytosis noted, continue to monitor  Antipyretics p.r.n. in place   Will consult Urology given CT findings   IV fluids-normal saline at 100 cc/hour   P.r.n. Fioricet for headache   This is chronic, hold off on CT  Consult regarding substance use cessation   Resumed home meds-bupropion, pramipexole, Zoloft   Hold home HCTZ given hyponatremia  Resumed lisinopril   DVT prophylaxis-subQ Lovenox    Critical care time-45 minutes  Critical care diagnosis-high-grade bacteremia requiring IV antibiotics   Critical care interventions: Hands-on evaluation, review of  labs/radiographs/records and discussions with patient       All diagnosis and differential diagnosis have been reviewed; assessment and plan has been documented; I have personally reviewed the labs and test results that are presently available; I have reviewed the patients medication list; I have reviewed the consulting providers response and recommendations. I have reviewed or attempted to review medical records based upon their availability.    All of the patient and family questions have been addressed and answered. Patient's is agreeable to the above stated plan. I will continue to monitor closely and make adjustments to medical management as needed.      02/24/2024

## 2024-02-24 NOTE — H&P
[facility] - Emergency Dept    History & Physical      Patient Name: Hair Gil  MRN: [id number]  Admission Date: 2/23/2024  Attending Physician: Kalani Way  Primary Care Provider: Sg Melendez III, MD      Tele-Nocturnist History and Physical  Service was provided using HIPPA compliant web platform using SOC for audio/visual equipment.   Patient Location: [place], [place]  Provider Location:   Participants on call: bedside RN, patient    Patient information was obtained from patient and ER records.     Subjective:     Principal Problem: Acute pyelonephritis    Chief Complaint:   Chief Complaint   Patient presents with    Flank Pain     c/o back and flank pain X 2 days        HPI: 43 y/o female with pmhx of HTN, Anxiety and Depression who presented to the ED for evaluation of right flank pain that started about 2 days ago. The pain is sharp and radiates to the back and associated with dysuria, fever and chills. No nausea or vomiting.  She has not taken any of her medications in over 1 year.    U/A consistent with UTI.  CT Abd/pelvis consistent with right pyelonephritis or Nephronia.     ED Course: Received Ceftriaxone 1g, Toradol 15 mg iv and 1L of Nacl in ED.    Past Medical History:   Diagnosis Date    Anxiety     Depression     Hypertension        Past Surgical History:   Procedure Laterality Date    CHOLECYSTECTOMY      TUBAL LIGATION      TYMPANOSTOMY TUBE PLACEMENT         Review of patient's allergies indicates:  No Known Allergies    No current facility-administered medications on file prior to encounter.     Current Outpatient Medications on File Prior to Encounter   Medication Sig    acetaminophen (TYLENOL) 650 MG TbSR Take 1 tablet (650 mg total) by mouth every 8 (eight) hours.    benazepril-hydrochlorthiazide (LOTENSIN HCT) 20-12.5 mg per tablet Take 1 tablet by mouth once daily.    buPROPion (WELLBUTRIN XL) 150 MG TB24 tablet Take 150 mg by mouth once  daily.    cyclobenzaprine (FLEXERIL) 10 MG tablet Take 0.5 tablets (5 mg total) by mouth 3 (three) times daily as needed for Muscle spasms.    diclofenac (VOLTAREN) 50 MG EC tablet Take 1 tablet (50 mg total) by mouth 3 (three) times daily as needed.    ketorolac (TORADOL) 10 mg tablet Take 1 tablet (10 mg total) by mouth every 8 (eight) hours as needed for Pain.    pramipexole (MIRAPEX) 0.25 MG tablet Take 0.5 mg by mouth every evening.    sertraline (ZOLOFT) 100 MG tablet Take 100 mg by mouth once daily.     Family History    None       Tobacco Use    Smoking status: Never    Smokeless tobacco: Not on file   Substance and Sexual Activity    Alcohol use: Not on file    Drug use: Not on file    Sexual activity: Not on file     Review of Systems  All systems reviewed and stated as negative per the patient except as stated in HPI.  GEN: denies fatigue  Eye: denies visual changes  HENT: denies throat pain  RESP: denies shortness of breath, cough or sputum production  CARDIAC: denies chest pain or palpitations  GI: denies nausea, vomiting, or diarrhea  SKIN: denies rash    Objective:     Vital Signs (Most Recent):  Temp: 99.9 °F (37.7 °C) (02/23/24 1436)  Pulse: 94 (02/23/24 1746)  Resp: 20 (02/23/24 1746)  BP: (!) 158/86 (02/23/24 1746)  SpO2: 99 % (02/23/24 1746) Vital Signs (24h Range):  Temp:  [99.9 °F (37.7 °C)] 99.9 °F (37.7 °C)  Pulse:  [] 94  Resp:  [18-20] 20  SpO2:  [98 %-100 %] 99 %  BP: (158-170)/(72-86) 158/86     Weight: 97.5 kg (215 lb)  Body mass index is 29.99 kg/m².    Physical Exam   GEN: Appears stated age, no acute distress, well nourished  EYES: no conjunctival injection, EOMI, normal hearing  HENT: no goiter, moist mucous membranes, no glossitis  RESP: no conversational dyspnea, no accessory muscle use  CARDIAC: no edema  GI: soft non tender, non-distended + bowel sounds  SKIN: no rashes noted  Neuro: CN II-XII intact, non focal exam  Psych: Alert and oriented x3, normal  affect    Significant Labs: All pertinent labs within the past 24 hours have been reviewed.    Significant Imaging: I have reviewed all pertinent imaging results/findings within the past 24 hours.    Assessment/Plan:     Assessment/Plan  #. Acute Pyelonephritis - Right sided  -Ceftriaxone 1 g iv daily  -IV Fluid Nacl @ 150 ml/h  -Toradol 15 mg iv Q6H  -F/u Urine culture and Blood culture    #. HTN  -Resume home meds    #. Hx of Anxiety and Depression  -Has not taken meds in over 1 year    VTE: Enoxaparin  Diet: Regular  Code status: Full code  Independently reviewed imaging, labs and vitals as pertinent to clinical condition    Time spent with patient, assessment, chart review, medication, patient education: > 75 minutes         Disposition:   - The patient is expected to have a LOS more than 2 midnights and will be admitted to inpatient status under my care.        There are no hospital problems to display for this patient.    VTE Risk Mitigation (From admission, onward)      None              Kalani Way MD  Department of Hospital Medicine   [facility] - Emergency Dept

## 2024-02-24 NOTE — CONSULTS
Hair Gil 1980  [id number]  2/24/2024    CONSULTING PHYSICIAN: CAMERON Martinez    CC:  Right pyelonephritis      HPI: The patient is a 44-year-old female with a medical history of hypertension anxiety and depression.  Presented to the emergency department with right flank pain.  Patient reports that has been present for about 2 days.  Radiates to the back.  Some dysuria fever and chills.  No nausea vomiting.  Denies prior history of stone disease.  No blood in the urine.  She has had pyelo in the past.  No recent IVD use.      Past Medical History:   Diagnosis Date    Anxiety     Depression     Hypertension        Past Surgical History:   Procedure Laterality Date    CHOLECYSTECTOMY      TUBAL LIGATION      TYMPANOSTOMY TUBE PLACEMENT         No family history on file.    Social History     Tobacco Use    Smoking status: Never       Current Facility-Administered Medications   Medication Dose Route Frequency Provider Last Rate Last Admin    0.9%  NaCl infusion   Intravenous Continuous Clary Velez  mL/hr at 02/24/24 1344 Rate Change at 02/24/24 1344    acetaminophen tablet 650 mg  650 mg Oral Q6H PRN Kalani Way MD   650 mg at 02/24/24 0357    buPROPion TB24 tablet 150 mg  150 mg Oral Daily Clary Velez MD   150 mg at 02/24/24 1343    butalbital-acetaminophen-caffeine -40 mg per tablet 1 tablet  1 tablet Oral Q4H PRN Clary Velez MD   1 tablet at 02/24/24 1255    ceFEPIme (MAXIPIME) 1 g in dextrose 5 % in water (D5W) 100 mL IVPB (MB+)  1 g Intravenous Q8H Clary Velez MD   Stopped at 02/24/24 1053    enoxaparin injection 40 mg  40 mg Subcutaneous Q24H (prophylaxis, 1700) Kalani Way MD        ketorolac injection 15 mg  15 mg Intravenous Q6H PRN Kalani Way MD   15 mg at 02/24/24 1107    lisinopriL tablet 20 mg  20 mg Oral Daily Radhika De Leon MD   20 mg at 02/24/24 0902    mupirocin 2 % ointment   Nasal BID Radhika De Leon MD   Given at 02/24/24 0902    ondansetron injection 4 mg  " 4 mg Intravenous Q8H PRN Jumana Gamboa, AGACNP-BC        pramipexole tablet 0.5 mg  0.5 mg Oral QHS Clary Velez MD        sertraline tablet 100 mg  100 mg Oral Daily Clary Velez MD   100 mg at 02/24/24 1343       Review of patient's allergies indicates:  No Known Allergies    ROS: 12 point review of systems negative other than the HPI      PHYSICAL EXAM:  Vitals:    02/24/24 0357 02/24/24 0357 02/24/24 0718 02/24/24 1125   BP:  (!) 169/82 (!) 169/82 (!) 144/80   Pulse: 105 98 98 93   Resp: 18  18 18   Temp: (!) 101 °F (38.3 °C) (!) 101 °F (38.3 °C) (!) 101 °F (38.3 °C) 98.9 °F (37.2 °C)   TempSrc:  Oral  Oral   SpO2: 100% 100% 100% 100%   Weight:   97.5 kg (215 lb)    Height:   5' 11" (1.803 m)          Intake/Output Summary (Last 24 hours) at 2/24/2024 1403  Last data filed at 2/24/2024 1106  Gross per 24 hour   Intake 1099 ml   Output 1300 ml   Net -201 ml       GEN: WN/WD NAD  HEENT: NCAT, PERRLA, EOMI, OP clear, nares patent  CV: RRR  RESP: Even and unlabored  ABD:  Soft, nontender, nondistended.  Mild right flank tenderness To palpation  :   deferred  EXT: no C/C/E  NEURO: no focal deficits, RAKESH, AAOx4      LABS:  [unfilled]      Recent Results (from the past 24 hour(s))   Comprehensive metabolic panel    Collection Time: 02/23/24  3:27 PM   Result Value Ref Range    Sodium Level 132 (L) 136 - 145 mmol/L    Potassium Level 3.5 3.5 - 5.1 mmol/L    Chloride 99 98 - 107 mmol/L    Carbon Dioxide 23 22 - 29 mmol/L    Glucose Level 112 (H) 74 - 100 mg/dL    Blood Urea Nitrogen 11.5 7.0 - 18.7 mg/dL    Creatinine 1.01 0.55 - 1.02 mg/dL    Calcium Level Total 9.6 8.4 - 10.2 mg/dL    Protein Total 8.0 6.4 - 8.3 gm/dL    Albumin Level 3.8 3.5 - 5.0 g/dL    Globulin 4.2 (H) 2.4 - 3.5 gm/dL    Albumin/Globulin Ratio 0.9 (L) 1.1 - 2.0 ratio    Bilirubin Total 0.7 <=1.5 mg/dL    Alkaline Phosphatase 88 40 - 150 unit/L    Alanine Aminotransferase 34 0 - 55 unit/L    Aspartate Aminotransferase 32 5 - 34 unit/L    " eGFR >60 mls/min/1.73/m2   Urinalysis, Reflex to Urine Culture    Collection Time: 02/23/24  3:27 PM    Specimen: Urine   Result Value Ref Range    Color, UA Orange (A) Yellow, Light-Yellow, Dark Yellow, Shara, Straw    Appearance, UA Clear Clear    Specific Gravity, UA 1.020 1.005 - 1.030    pH, UA 7.5 5.0 - 8.5    Protein,  (A) Negative    Glucose,  (A) Negative, Normal    Ketones, UA Trace (A) Negative    Blood, UA Small (A) Negative    Bilirubin, UA Negative Negative    Urobilinogen, UA 4.0 (A) 0.2, 1.0, Normal    Nitrites, UA Positive (A) Negative    Leukocyte Esterase, UA Small (A) Negative   Pregnancy, urine rapid    Collection Time: 02/23/24  3:27 PM   Result Value Ref Range    Beta hCG Qualitative, Urine Negative Negative   CBC with Differential    Collection Time: 02/23/24  3:27 PM   Result Value Ref Range    WBC 26.70 (H) 4.50 - 11.50 x10(3)/mcL    RBC 4.58 4.20 - 5.40 x10(6)/mcL    Hgb 13.4 12.0 - 16.0 g/dL    Hct 41.0 37.0 - 47.0 %    MCV 89.5 80.0 - 94.0 fL    MCH 29.3 27.0 - 31.0 pg    MCHC 32.7 (L) 33.0 - 36.0 g/dL    RDW 13.3 11.5 - 17.0 %    Platelet 306 130 - 400 x10(3)/mcL    MPV 11.3 (H) 7.4 - 10.4 fL    NRBC% 0.0 %   COVID/RSV/FLU A&B PCR    Collection Time: 02/23/24  3:27 PM   Result Value Ref Range    Influenza A PCR Not Detected Not Detected    Influenza B PCR Not Detected Not Detected    Respiratory Syncytial Virus PCR Not Detected Not Detected    SARS-CoV-2 PCR Not Detected Not Detected, Negative   Chlamydia/GC, PCR    Collection Time: 02/23/24  3:27 PM    Specimen: Urine   Result Value Ref Range    Chlamydia trachomatis PCR Not Detected Not Detected    N. gonorrhea PCR Not Detected Not Detected    Source Urine    Lactic acid, plasma    Collection Time: 02/23/24  3:27 PM   Result Value Ref Range    Lactic Acid Level 2.2 0.5 - 2.2 mmol/L   CK    Collection Time: 02/23/24  3:27 PM   Result Value Ref Range    Creatine Kinase 59 29 - 168 U/L   Urinalysis, Microscopic    Collection  Time: 02/23/24  3:27 PM   Result Value Ref Range    Bacteria, UA Few (A) None Seen, Rare, Occasional /HPF    Mucous, UA Trace (A) None Seen /LPF    RBC, UA 3-5 None Seen, 0-2, 3-5, 0-5 /HPF    WBC, UA 21-50 (A) None Seen, 0-2, 3-5, 0-5 /HPF    Squamous Epithelial Cells, UA Moderate (A) None Seen, Rare, Occasional, Occ /HPF   Urine culture    Collection Time: 02/23/24  3:27 PM    Specimen: Urine   Result Value Ref Range    Urine Culture >/= 100,000 colonies/ml Gram-negative Rods (A)    Manual Differential    Collection Time: 02/23/24  3:27 PM   Result Value Ref Range    Neutrophils % 84 (H) 47 - 80 %    Bands % 2 0 - 11 %    Lymphs % 6 (L) 13 - 40 %    Monocytes % 8 2 - 11 %    Neutrophils Abs Calc 22.962 (H) 2.1 - 9.2 x10(3)/mcL    Lymphs Abs 1.602 0.6 - 4.6 x10(3)/mcL    Monocytes Abs 2.136 (H) 0.1 - 1.3 x10(3)/mcL    Platelets Adequate Normal, Adequate    RBC Morph Normal Normal   Blood Culture #1 **CANNOT BE ORDERED STAT**    Collection Time: 02/23/24  4:24 PM    Specimen: Antecubital, Right; Blood   Result Value Ref Range    GRAM STAIN Gram Negative Rods (AA)     GRAM STAIN Seen in gram stain of broth only (AA)     GRAM STAIN 2 of 2 bottles positive (AA)    BCID2 Panel    Collection Time: 02/23/24  4:24 PM    Specimen: Antecubital, Right; Blood   Result Value Ref Range    CTX-M (ESBL ) Not Detected Not Detected, N/A    IMP (Cabapenemase ) Not Detected Not Detected, N/A    KPC resistance gene (Carbapenemase ) Not Detected Not Detected, N/A    mcr-1 Not Detected Not Detected, N/A    mecA ID N/A Not Detected, N/A    mecA/C and MREJ (MRSA) gene N/A Not Detected, N/A    NDM (Carbapenemase ) Not Detected Not Detected, N/A    OXA-48-like (Carbapenemase ) Not Detected Not Detected, N/A    Cinthya/B (VRE gene) N/A Not Detected, N/A    VIM (Carbapenemase ) Not Detected Not Detected, N/A    Enterococcus faecalis Not Detected Not Detected    Enterococcus faecium Not Detected Not  Detected    Listeria monocytogenes Not Detected Not Detected    Staphylococcus spp. Not Detected Not Detected    Staphylococcus aureus Not Detected Not Detected    Staphylococcus epidermidis Not Detected Not Detected    Staphylococcus lugdunensis Not Detected Not Detected    Streptococcus spp. Not Detected Not Detected    Streptococcus agalactiae (Group B) Not Detected Not Detected    Streptococcus pneumoniae Not Detected Not Detected    Streptococcus pyogenes (Group A) Not Detected Not Detected    Acinetobacter calcoaceticus/baumannii complex Not Detected Not Detected    Bacteroides fragilis Not Detected Not Detected    Enterobacterales Detected (A) Not Detected    Enterobacter cloacae complex Not Detected Not Detected    Escherichia coli Detected (A) Not Detected    Klebsiella aerogenes Not Detected Not Detected    Klebsiella oxytoca Not Detected Not Detected    Klebsiella pneumoniae group Not Detected Not Detected    Proteus spp. Not Detected Not Detected    Salmonella spp. Not Detected Not Detected    Serratia marcescens Not Detected Not Detected    Haemophilus influenzae Not Detected Not Detected    Neisseria meningitidis Not Detected Not Detected    Pseudomonas aeruginosa Not Detected Not Detected    Stenotrophomonas maltophilia Not Detected Not Detected    Candida albicans Not Detected Not Detected    Candida auris Not Detected Not Detected    Candida glabrata Not Detected Not Detected    Candida krusei Not Detected Not Detected    Candida parapsilosis Not Detected Not Detected    Candida tropicalis Not Detected Not Detected    Cryptococcus neoformans/gattii Not Detected Not Detected   Lactic Acid, Plasma    Collection Time: 02/23/24  4:36 PM   Result Value Ref Range    Lactic Acid Level 1.2 0.5 - 2.2 mmol/L   Blood Culture #2 **CANNOT BE ORDERED STAT**    Collection Time: 02/23/24  4:36 PM    Specimen: Forearm, Right; Blood   Result Value Ref Range    GRAM STAIN Gram Negative Rods (AA)     GRAM STAIN Seen in  gram stain of broth only (AA)     GRAM STAIN 2 of 2 bottles positive (AA)          IMAGING:  Imaging Results              CT Abdomen Pelvis With IV Contrast NO Oral Contrast (Final result)  Result time 02/23/24 17:15:23      Final result by Lesa Parker MD (02/23/24 17:15:23)                   Impression:      Findings seen consistent with lobar nephronia/pyelonephritis in the right kidney      Electronically signed by: Cortez Parker  Date:    02/23/2024  Time:    17:15               Narrative:    EXAMINATION:  CT ABDOMEN PELVIS WITH IV CONTRAST    CLINICAL HISTORY:  UTI, recurrent/complicated (Female);    TECHNIQUE:  Low dose axial images, sagittal and coronal reformations were obtained from the lung bases to the pubic symphysis following the IV administration of contrast. Automatic exposure control (AEC) is utilized to reduce patient radiation exposure.    COMPARISON:  None.    FINDINGS:  The lung bases are clear.    The liver appears normal.  No liver mass or lesion is seen.  Portal and hepatic veins appear normal.    The patient is status post cholecystectomy.    The pancreas appears normal.  No pancreatic mass or lesion is seen.    The spleen shows no acute abnormality.    The adrenal glands appear normal.  No adrenal nodule is seen.    There is perinephric stranding around the right kidney and there is heterogeneous enhancement in the upper pole of the right kidney consistent with lobar nephronia/pyelonephritis.  No hydronephrosis is seen.  No hydroureter is seen.  No nephrolithiasis is seen.  No obvious ureteral stones are seen.    Urinary bladder appears grossly unremarkable.    No colitis is seen.  No diverticulitis is seen.  No obvious colonic mass or lesion is seen.    No free air is seen.  No free fluid is seen.                                    ntains abnormal data Urine culture  Order: [id number] - Reflex for Order [id number]  Status: Preliminary result       Visible to patient: No (not  released)       Next appt: None    Specimen Information: Urine   0 Result Notes  Urine Culture >/= 100,000 colonies/ml Gram-negative Rods Abnormal            Resulting Agency: [facility]           Specimen Collected: 02/23/24 15:27 CST Last Resulted: 02/24/24 07:33 CST           ntains abnormal data BCID2 Panel  Order: [id number] - Reflex for Order [id number]  Status: Final result       Visible to patient: No (inaccessible in Patient Portal)       Next appt: None    Specimen Information: Antecubital, Right; Blood   0 Result Notes       Component Ref Range & Units 1 d ago   CTX-M (ESBL ) Not Detected, N/A Not Detected   IMP (Cabapenemase ) Not Detected, N/A Not Detected   KPC resistance gene (Carbapenemase ) Not Detected, N/A Not Detected   mcr-1 Not Detected, N/A Not Detected   mecA ID Not Detected, N/A N/A   Comment: Note: Antimicrobial resistance can occur via multiple mechanisms. A Not Detected result for antimicrobial resistance gene(s) does not indicate antimicrobial susceptibility. Subculturing is required for species identification and susceptibility testing of   isolates.   mecA/C and MREJ (MRSA) gene Not Detected, N/A N/A   NDM (Carbapenemase ) Not Detected, N/A Not Detected   OXA-48-like (Carbapenemase ) Not Detected, N/A Not Detected   Cinthya/B (VRE gene) Not Detected, N/A N/A   VIM (Carbapenemase ) Not Detected, N/A Not Detected   Enterococcus faecalis Not Detected Not Detected   Enterococcus faecium Not Detected Not Detected   Listeria monocytogenes Not Detected Not Detected   Staphylococcus spp. Not Detected Not Detected   Staphylococcus aureus Not Detected Not Detected   Staphylococcus epidermidis Not Detected Not Detected   Staphylococcus lugdunensis Not Detected Not Detected   Streptococcus spp. Not Detected Not Detected   Streptococcus agalactiae (Group B) Not Detected Not Detected   Streptococcus pneumoniae Not Detected Not Detected   Streptococcus  pyogenes (Group A) Not Detected Not Detected   Acinetobacter calcoaceticus/baumannii complex Not Detected Not Detected   Bacteroides fragilis Not Detected Not Detected   Enterobacterales Not Detected Detected Abnormal    Enterobacter cloacae complex Not Detected Not Detected   Escherichia coli Not Detected Detected Abnormal    Klebsiella aerogenes Not Detected Not Detected   Klebsiella oxytoca Not Detected Not Detected   Klebsiella pneumoniae group Not Detected Not Detected   Proteus spp. Not Detected Not Detected   Salmonella spp. Not Detected Not Detected   Serratia marcescens Not Detected Not Detected   Haemophilus influenzae Not Detected Not Detected   Neisseria meningitidis Not Detected Not Detected   Pseudomonas aeruginosa Not Detected Not Detected   Stenotrophomonas maltophilia Not Detected Not Detected   Candida albicans Not Detected Not Detected   Candida auris Not Detected Not Detected   Candida glabrata Not Detected Not Detected   Candida krusei Not Detected Not Detected   Candida parapsilosis Not Detected Not Detected   Candida tropicalis Not Detected Not Detected   Cryptococcus neoformans/gattii Not Detected Not Detected   Resulting Agency  [facility]                       ASSESSMENT:  Pyelonephritis    PLAN:  The patient is seen and examined and her labs and imaging is reviewed.  Clinical course seems consistent with pyelonephritis.  CT scan with no obstructing lesions in the right ureter.  No evidence of abscess on scan.  At this point we recommend antibiotics, supportive management, fluids as needed.  We will continue to follow.  If she fails to improve or worsens we can consider repeat imaging to rule out developing abscess.  No obvious surgical intervention required at this time    Pee Martinez MD

## 2024-02-25 LAB
ALBUMIN SERPL-MCNC: 2.6 G/DL (ref 3.5–5)
ALBUMIN/GLOB SERPL: 0.8 RATIO (ref 1.1–2)
ALP SERPL-CCNC: 83 UNIT/L (ref 40–150)
ALT SERPL-CCNC: 26 UNIT/L (ref 0–55)
AST SERPL-CCNC: 19 UNIT/L (ref 5–34)
BACTERIA UR CULT: ABNORMAL
BASOPHILS # BLD AUTO: 0.05 X10(3)/MCL
BASOPHILS NFR BLD AUTO: 0.3 %
BILIRUB SERPL-MCNC: 0.4 MG/DL
BUN SERPL-MCNC: 11.8 MG/DL (ref 7–18.7)
CALCIUM SERPL-MCNC: 8.1 MG/DL (ref 8.4–10.2)
CHLORIDE SERPL-SCNC: 106 MMOL/L (ref 98–107)
CO2 SERPL-SCNC: 20 MMOL/L (ref 22–29)
CREAT SERPL-MCNC: 0.67 MG/DL (ref 0.55–1.02)
EOSINOPHIL # BLD AUTO: 0.01 X10(3)/MCL (ref 0–0.9)
EOSINOPHIL NFR BLD AUTO: 0.1 %
ERYTHROCYTE [DISTWIDTH] IN BLOOD BY AUTOMATED COUNT: 13.7 % (ref 11.5–17)
GFR SERPLBLD CREATININE-BSD FMLA CKD-EPI: >60 MLS/MIN/1.73/M2
GLOBULIN SER-MCNC: 3.2 GM/DL (ref 2.4–3.5)
GLUCOSE SERPL-MCNC: 128 MG/DL (ref 74–100)
HCT VFR BLD AUTO: 33.5 % (ref 37–47)
HGB BLD-MCNC: 10.9 G/DL (ref 12–16)
IMM GRANULOCYTES # BLD AUTO: 0.12 X10(3)/MCL (ref 0–0.04)
IMM GRANULOCYTES NFR BLD AUTO: 0.8 %
LYMPHOCYTES # BLD AUTO: 1.22 X10(3)/MCL (ref 0.6–4.6)
LYMPHOCYTES NFR BLD AUTO: 8.1 %
MCH RBC QN AUTO: 28.9 PG (ref 27–31)
MCHC RBC AUTO-ENTMCNC: 32.5 G/DL (ref 33–36)
MCV RBC AUTO: 88.9 FL (ref 80–94)
MONOCYTES # BLD AUTO: 1.89 X10(3)/MCL (ref 0.1–1.3)
MONOCYTES NFR BLD AUTO: 12.6 %
NEUTROPHILS # BLD AUTO: 11.69 X10(3)/MCL (ref 2.1–9.2)
NEUTROPHILS NFR BLD AUTO: 78.1 %
NRBC BLD AUTO-RTO: 0 %
PLATELET # BLD AUTO: 179 X10(3)/MCL (ref 130–400)
PMV BLD AUTO: 11.9 FL (ref 7.4–10.4)
POCT GLUCOSE: 90 MG/DL (ref 70–110)
POTASSIUM SERPL-SCNC: 3.4 MMOL/L (ref 3.5–5.1)
PROT SERPL-MCNC: 5.8 GM/DL (ref 6.4–8.3)
RBC # BLD AUTO: 3.77 X10(6)/MCL (ref 4.2–5.4)
SODIUM SERPL-SCNC: 132 MMOL/L (ref 136–145)
WBC # SPEC AUTO: 14.98 X10(3)/MCL (ref 4.5–11.5)

## 2024-02-25 PROCEDURE — 21400001 HC TELEMETRY ROOM

## 2024-02-25 PROCEDURE — 63600175 PHARM REV CODE 636 W HCPCS: Performed by: INTERNAL MEDICINE

## 2024-02-25 PROCEDURE — 25000003 PHARM REV CODE 250: Performed by: INTERNAL MEDICINE

## 2024-02-25 PROCEDURE — 63600175 PHARM REV CODE 636 W HCPCS: Performed by: NURSE PRACTITIONER

## 2024-02-25 PROCEDURE — 85025 COMPLETE CBC W/AUTO DIFF WBC: CPT | Performed by: INTERNAL MEDICINE

## 2024-02-25 PROCEDURE — 80053 COMPREHEN METABOLIC PANEL: CPT | Performed by: INTERNAL MEDICINE

## 2024-02-25 RX ORDER — POTASSIUM CHLORIDE 20 MEQ/1
20 TABLET, EXTENDED RELEASE ORAL ONCE
Status: COMPLETED | OUTPATIENT
Start: 2024-02-25 | End: 2024-02-25

## 2024-02-25 RX ORDER — SODIUM CHLORIDE, SODIUM LACTATE, POTASSIUM CHLORIDE, CALCIUM CHLORIDE 600; 310; 30; 20 MG/100ML; MG/100ML; MG/100ML; MG/100ML
INJECTION, SOLUTION INTRAVENOUS CONTINUOUS
Status: DISCONTINUED | OUTPATIENT
Start: 2024-02-25 | End: 2024-02-27

## 2024-02-25 RX ADMIN — KETOROLAC TROMETHAMINE 15 MG: 30 INJECTION, SOLUTION INTRAMUSCULAR; INTRAVENOUS at 05:02

## 2024-02-25 RX ADMIN — SODIUM CHLORIDE, POTASSIUM CHLORIDE, SODIUM LACTATE AND CALCIUM CHLORIDE: 600; 310; 30; 20 INJECTION, SOLUTION INTRAVENOUS at 01:02

## 2024-02-25 RX ADMIN — ACETAMINOPHEN 650 MG: 325 TABLET, FILM COATED ORAL at 06:02

## 2024-02-25 RX ADMIN — CEFEPIME 1 G: 1 INJECTION, POWDER, FOR SOLUTION INTRAMUSCULAR; INTRAVENOUS at 02:02

## 2024-02-25 RX ADMIN — ONDANSETRON 4 MG: 2 INJECTION INTRAMUSCULAR; INTRAVENOUS at 04:02

## 2024-02-25 RX ADMIN — CEFEPIME 1 G: 1 INJECTION, POWDER, FOR SOLUTION INTRAMUSCULAR; INTRAVENOUS at 11:02

## 2024-02-25 RX ADMIN — KETOROLAC TROMETHAMINE 15 MG: 30 INJECTION, SOLUTION INTRAMUSCULAR; INTRAVENOUS at 01:02

## 2024-02-25 RX ADMIN — SERTRALINE HYDROCHLORIDE 100 MG: 50 TABLET ORAL at 08:02

## 2024-02-25 RX ADMIN — BUPROPION HYDROCHLORIDE 150 MG: 150 TABLET, FILM COATED, EXTENDED RELEASE ORAL at 08:02

## 2024-02-25 RX ADMIN — POTASSIUM CHLORIDE 20 MEQ: 1500 TABLET, EXTENDED RELEASE ORAL at 11:02

## 2024-02-25 RX ADMIN — LISINOPRIL 20 MG: 20 TABLET ORAL at 08:02

## 2024-02-25 RX ADMIN — PRAMIPEXOLE DIHYDROCHLORIDE 0.5 MG: 0.25 TABLET ORAL at 08:02

## 2024-02-25 RX ADMIN — ENOXAPARIN SODIUM 40 MG: 40 INJECTION SUBCUTANEOUS at 04:02

## 2024-02-25 RX ADMIN — MUPIROCIN: 20 OINTMENT TOPICAL at 08:02

## 2024-02-25 NOTE — PROGRESS NOTES
[facility]  Hospital Medicine Progress Note        Chief Complaint: Inpatient Follow-up     HPI:    44 y.o. female with a PMHx of HTN, anxiety/depression who presented to [facility] orthopedic ED on 2/23/2024 with c/o right flank pain x2 days.  Pain described as sharp and radiates to her back with associated dysuria, fever, chills.  Denied any nausea or vomiting.  Labs notable for WBC 26.7, sodium 132, glucose 112.  Lactic acid normal.  HCG negative.  Influenza, RSV, COVID-19 negative.  Urinalysis with 100 protein, 100 glucose, trace ketones, small blood, positive nitrites, small leukocyte esterase, 21-50 WBCs, few bacteria, moderate squamous epithelial cells and trace mucus.  Urine chlamydia and gonorrhea PCR is negative.  Blood cultures x2 obtained and urine culture pending.  CT abdomen and pelvis with IV contrast consistent with lobar nephronia/pyelonephritis in the right kidney.  She was started on IV ceftriaxone.  Admitted to hospital medicine services and transferred to [facility] for further medical management.     Urine culture noted to be growing Gram-negative rods.  Blood cultures x2 growing Gram-negative rods.BC ID panel with Enterobacter/E coli,switched her IV ceftriaxone to cefepime,Fourth generation cephalosporin- expected to cover both.    Urology team evaluated the patient, recommended to continue IV antibiotic therapy   Interval Hx:   No acute events reported overnight.  Patient reported she felt nauseous early a.m. received Zofran with improved nausea otherwise no complaints during my rounds.  Reported feeling improving overall  T-max 99.6°  Case was discussed with patient's nurse and  on the floor.    Objective/physical exam:  General: In no acute distress, afebrile  Chest: Clear to auscultation bilaterally  Heart:  +S1, S2, no appreciable murmur  Abdomen: Soft  : Right flank tenderness  MSK: Warm, no lower extremity edema  Neurologic: Alert and oriented grossly  non focal  VITAL SIGNS: 24 HRS MIN & MAX LAST   Temp  Min: 97.9 °F (36.6 °C)  Max: 99.6 °F (37.6 °C) 97.9 °F (36.6 °C)   BP  Min: 144/80  Max: 160/71 (!) 147/88   Pulse  Min: 61  Max: 93  71   Resp  Min: 18  Max: 18 18   SpO2  Min: 85 %  Max: 100 % 99 %     I have reviewed the following labs:  Recent Labs   Lab 02/23/24  1527 02/25/24  0456   WBC 26.70* 14.98*   RBC 4.58 3.77*   HGB 13.4 10.9*   HCT 41.0 33.5*   MCV 89.5 88.9   MCH 29.3 28.9   MCHC 32.7* 32.5*   RDW 13.3 13.7    179   MPV 11.3* 11.9*     Recent Labs   Lab 02/23/24  1527 02/25/24  0456   * 132*   K 3.5 3.4*   CO2 23 20*   BUN 11.5 11.8   CREATININE 1.01 0.67   CALCIUM 9.6 8.1*   ALBUMIN 3.8 2.6*   ALKPHOS 88 83   ALT 34 26   AST 32 19   BILITOT 0.7 0.4     Microbiology Results (last 7 days)       Procedure Component Value Units Date/Time    BCID2 Panel [[id number]]  (Abnormal) Collected: 02/23/24 1624    Order Status: Completed Specimen: Blood from Antecubital, Right Updated: 02/24/24 1016     CTX-M (ESBL ) Not Detected     IMP (Cabapenemase ) Not Detected     KPC resistance gene (Carbapenemase ) Not Detected     mcr-1 Not Detected     mecA ID N/A     Comment: Note: Antimicrobial resistance can occur via multiple mechanisms. A Not Detected result for antimicrobial resistance gene(s) does not indicate antimicrobial susceptibility. Subculturing is required for species identification and susceptibility testing of   isolates.        mecA/C and MREJ (MRSA) gene N/A     NDM (Carbapenemase ) Not Detected     OXA-48-like (Carbapenemase ) Not Detected     Cinthya/B (VRE gene) N/A     VIM (Carbapenemase ) Not Detected     Enterococcus faecalis Not Detected     Enterococcus faecium Not Detected     Listeria monocytogenes Not Detected     Staphylococcus spp. Not Detected     Staphylococcus aureus Not Detected     Staphylococcus epidermidis Not Detected     Staphylococcus lugdunensis Not Detected      Streptococcus spp. Not Detected     Streptococcus agalactiae (Group B) Not Detected     Streptococcus pneumoniae Not Detected     Streptococcus pyogenes (Group A) Not Detected     Acinetobacter calcoaceticus/baumannii complex Not Detected     Bacteroides fragilis Not Detected     Enterobacterales Detected     Enterobacter cloacae complex Not Detected     Escherichia coli Detected     Klebsiella aerogenes Not Detected     Klebsiella oxytoca Not Detected     Klebsiella pneumoniae group Not Detected     Proteus spp. Not Detected     Salmonella spp. Not Detected     Serratia marcescens Not Detected     Haemophilus influenzae Not Detected     Neisseria meningitidis Not Detected     Pseudomonas aeruginosa Not Detected     Stenotrophomonas maltophilia Not Detected     Candida albicans Not Detected     Candida auris Not Detected     Candida glabrata Not Detected     Candida krusei Not Detected     Candida parapsilosis Not Detected     Candida tropicalis Not Detected     Cryptococcus neoformans/gattii Not Detected    Narrative:      The [employer] BCID2 Panel is a multiplexed nucleic acid test intended for the use with [employer]® [facility].0 or [employer] for the simultaneous qualitative detection and identification of multiple bacterial and yeast nucleic acids and select genetic determinants associated with antimicrobial resistance.  The [employer] BCID2 Panel test is performed directly on blood culture samples identified as positive by a continuous monitoring blood culture system.  Results are intended to be interpreted in conjunction with Gram stain results.    Urine culture [[id number]]  (Abnormal) Collected: 02/23/24 1527    Order Status: Completed Specimen: Urine Updated: 02/24/24 0733     Urine Culture >/= 100,000 colonies/ml Gram-negative Rods    Blood Culture #2 **CANNOT BE ORDERED STAT** [[id number]]  (Abnormal) Collected: 02/23/24 1636    Order Status: Completed Specimen: Blood from Forearm,  Right Updated: 02/24/24 0710     GRAM STAIN Gram Negative Rods      Seen in gram stain of broth only      2 of 2 bottles positive    Blood Culture #1 **CANNOT BE ORDERED STAT** [[id number]]  (Abnormal) Collected: 02/23/24 1624    Order Status: Completed Specimen: Blood from Antecubital, Right Updated: 02/24/24 0710     GRAM STAIN Gram Negative Rods      Seen in gram stain of broth only      2 of 2 bottles positive    Chlamydia/GC, PCR [[id number]] Collected: 02/23/24 1527    Order Status: Completed Specimen: Urine Updated: 02/23/24 2030     Chlamydia trachomatis PCR Not Detected     N. gonorrhea PCR Not Detected     Source Urine    Narrative:      The Xpert CT/NG test, performed on the GeneXpert system is a qualitative in vitro real-time polymerase chain reaction (PCR) test for the automated detected and differentiation for genomic DNA from Chlamydia trachomatis (CT) and/or Neisseria gonorrhoeae (NG).             See below for Radiology    Scheduled Med:   buPROPion  150 mg Oral Daily    ceFEPime IV (PEDS and ADULTS)  1 g Intravenous Q8H    enoxparin  40 mg Subcutaneous Q24H (prophylaxis, 1700)    lisinopriL  20 mg Oral Daily    mupirocin   Nasal BID    potassium chloride  20 mEq Oral Once    pramipexole  0.5 mg Oral QHS    sertraline  100 mg Oral Daily      Continuous Infusions:     PRN Meds:  acetaminophen, butalbital-acetaminophen-caffeine -40 mg, ketorolac, ondansetron     Assessment/Plan:  Acute bacterial UTI with right-sided pyelonephritis/lobar nephronia  Sepsis secondary to above  High-grade bacteremia-GNR secondary to above  Mild hyponatremia  Acute on chronic head ache  History of substance use-amphetamine use   History of anxiety/depression  History of essential HTN with occasional acceleration    Patient improving   Labs from this morning noted WBC count improved WBC 14.98 K, hemoglobin 10.9, BUN 11.8, creatinine 0.6, K 3.4, sodium 132  Micro reviewed, urine cultures growing greater than 100,000  colonies Gram-negative rods, blood cultures Gram-negative rods, follow up final culture results speciation sensitivities   Continue IV cefepime  Continue IV hydration   Continue supportive care for nausea, pain   Urology following, follow recommendations   K repleted  Case discussed with the patient's nurse  Labs tomorrow    VTE prophylaxis:  Lovenox    Patient condition:  Fair    Anticipated discharge and Disposition:   TBD      Critical care time spent:  35 minutes   Critical care diagnosis:  Sepsis needing IV antibiotics  _____________________________________________________________________    Nutrition Status:    Radiology:  I have personally reviewed the following imaging and agree with the radiologist.     CT Abdomen Pelvis With IV Contrast NO Oral Contrast  Narrative: EXAMINATION:  CT ABDOMEN PELVIS WITH IV CONTRAST    CLINICAL HISTORY:  UTI, recurrent/complicated (Female);    TECHNIQUE:  Low dose axial images, sagittal and coronal reformations were obtained from the lung bases to the pubic symphysis following the IV administration of contrast. Automatic exposure control (AEC) is utilized to reduce patient radiation exposure.    COMPARISON:  None.    FINDINGS:  The lung bases are clear.    The liver appears normal.  No liver mass or lesion is seen.  Portal and hepatic veins appear normal.    The patient is status post cholecystectomy.    The pancreas appears normal.  No pancreatic mass or lesion is seen.    The spleen shows no acute abnormality.    The adrenal glands appear normal.  No adrenal nodule is seen.    There is perinephric stranding around the right kidney and there is heterogeneous enhancement in the upper pole of the right kidney consistent with lobar nephronia/pyelonephritis.  No hydronephrosis is seen.  No hydroureter is seen.  No nephrolithiasis is seen.  No obvious ureteral stones are seen.    Urinary bladder appears grossly unremarkable.    No colitis is seen.  No diverticulitis is seen.  No  obvious colonic mass or lesion is seen.    No free air is seen.  No free fluid is seen.  Impression: Findings seen consistent with lobar nephronia/pyelonephritis in the right kidney    Electronically signed by: Cortez Parker  Date:    02/23/2024  Time:    17:15      Jennie Munoz MD  Department of Hospital Medicine   [facility]   02/25/2024

## 2024-02-25 NOTE — PROGRESS NOTES
UROLOGY  PROGRESS  NOTE    Hair Gil 1980  [id number]  2/25/2024    Primary Urologist: n/a  On Call Urology: Pee Martinez MD    Subjective:  44-year-old female admitted with presumed right pyelonephritis.  No acute events.  Clinically improved      Objective:  Wt Readings from Last 3 Encounters:   02/24/24 97.5 kg (215 lb)   02/03/22 81.6 kg (180 lb)   07/10/20 86.2 kg (190 lb)     Temp Readings from Last 3 Encounters:   02/25/24 98.2 °F (36.8 °C) (Oral)   02/03/22 98.4 °F (36.9 °C) (Oral)   07/13/20 98.1 °F (36.7 °C) (Oral)     BP Readings from Last 3 Encounters:   02/25/24 (!) 145/84   02/03/22 (!) 160/77   07/13/20 128/77     Pulse Readings from Last 3 Encounters:   02/25/24 90   02/03/22 86   07/13/20 90       Intake/Output:  No intake/output data recorded.  I/O last 3 completed shifts:  In: 3641.7 [P.O.:840; I.V.:2607.3; IV Piggyback:194.4]  Out: 2600 [Urine:2600]       Exam:    NCAT  Card RRR  Resp unlabored  Abd soft, nontender, nondistended   deferred  Extremity no C/C/E      Recent Results (from the past 24 hour(s))   Comprehensive Metabolic Panel    Collection Time: 02/25/24  4:56 AM   Result Value Ref Range    Sodium Level 132 (L) 136 - 145 mmol/L    Potassium Level 3.4 (L) 3.5 - 5.1 mmol/L    Chloride 106 98 - 107 mmol/L    Carbon Dioxide 20 (L) 22 - 29 mmol/L    Glucose Level 128 (H) 74 - 100 mg/dL    Blood Urea Nitrogen 11.8 7.0 - 18.7 mg/dL    Creatinine 0.67 0.55 - 1.02 mg/dL    Calcium Level Total 8.1 (L) 8.4 - 10.2 mg/dL    Protein Total 5.8 (L) 6.4 - 8.3 gm/dL    Albumin Level 2.6 (L) 3.5 - 5.0 g/dL    Globulin 3.2 2.4 - 3.5 gm/dL    Albumin/Globulin Ratio 0.8 (L) 1.1 - 2.0 ratio    Bilirubin Total 0.4 <=1.5 mg/dL    Alkaline Phosphatase 83 40 - 150 unit/L    Alanine Aminotransferase 26 0 - 55 unit/L    Aspartate Aminotransferase 19 5 - 34 unit/L    eGFR >60 mls/min/1.73/m2   CBC with Differential    Collection Time: 02/25/24  4:56 AM   Result Value Ref Range    WBC 14.98 (H) 4.50 - 11.50  x10(3)/mcL    RBC 3.77 (L) 4.20 - 5.40 x10(6)/mcL    Hgb 10.9 (L) 12.0 - 16.0 g/dL    Hct 33.5 (L) 37.0 - 47.0 %    MCV 88.9 80.0 - 94.0 fL    MCH 28.9 27.0 - 31.0 pg    MCHC 32.5 (L) 33.0 - 36.0 g/dL    RDW 13.7 11.5 - 17.0 %    Platelet 179 130 - 400 x10(3)/mcL    MPV 11.9 (H) 7.4 - 10.4 fL    Neut % 78.1 %    Lymph % 8.1 %    Mono % 12.6 %    Eos % 0.1 %    Basophil % 0.3 %    Lymph # 1.22 0.6 - 4.6 x10(3)/mcL    Neut # 11.69 (H) 2.1 - 9.2 x10(3)/mcL    Mono # 1.89 (H) 0.1 - 1.3 x10(3)/mcL    Eos # 0.01 0 - 0.9 x10(3)/mcL    Baso # 0.05 <=0.2 x10(3)/mcL    IG# 0.12 (H) 0 - 0.04 x10(3)/mcL    IG% 0.8 %    NRBC% 0.0 %   POCT glucose    Collection Time: 02/25/24 11:36 AM   Result Value Ref Range    POCT Glucose 90 70 - 110 mg/dL       Imaging Results              CT Abdomen Pelvis With IV Contrast NO Oral Contrast (Final result)  Result time 02/23/24 17:15:23      Final result by Lsea Parker MD (02/23/24 17:15:23)                   Impression:      Findings seen consistent with lobar nephronia/pyelonephritis in the right kidney      Electronically signed by: Cortez Parker  Date:    02/23/2024  Time:    17:15               Narrative:    EXAMINATION:  CT ABDOMEN PELVIS WITH IV CONTRAST    CLINICAL HISTORY:  UTI, recurrent/complicated (Female);    TECHNIQUE:  Low dose axial images, sagittal and coronal reformations were obtained from the lung bases to the pubic symphysis following the IV administration of contrast. Automatic exposure control (AEC) is utilized to reduce patient radiation exposure.    COMPARISON:  None.    FINDINGS:  The lung bases are clear.    The liver appears normal.  No liver mass or lesion is seen.  Portal and hepatic veins appear normal.    The patient is status post cholecystectomy.    The pancreas appears normal.  No pancreatic mass or lesion is seen.    The spleen shows no acute abnormality.    The adrenal glands appear normal.  No adrenal nodule is seen.    There is perinephric  stranding around the right kidney and there is heterogeneous enhancement in the upper pole of the right kidney consistent with lobar nephronia/pyelonephritis.  No hydronephrosis is seen.  No hydroureter is seen.  No nephrolithiasis is seen.  No obvious ureteral stones are seen.    Urinary bladder appears grossly unremarkable.    No colitis is seen.  No diverticulitis is seen.  No obvious colonic mass or lesion is seen.    No free air is seen.  No free fluid is seen.                                    tains critical data Blood Culture #2 **CANNOT BE ORDERED STAT**  Order: [id number]  Status: Preliminary result       Visible to patient: No (not released)       Next appt: None    Specimen Information: Forearm, Right; Blood   0 Result Notes  BLOOD CULTURE Susceptibility To Follow      Escherichia coli Abnormal                GRAM STAIN  Panic   Gram Negative Rods      Seen in gram stain of broth only      2 of 2 bottles positive              Resulting Agency: [facility]           Specimen Collected: 02/23/24 16:36 CST Last Resulted: 02/25/24 10:39 CST        Lab Flowsheet        Order Details        View Encounter        Lab and Collection Details        Routing        Result History     View All Conversations on this Encounter           Result Care Coordination      Patient Communication     Not Released  Not seen Back to Top             Assessment:  Right pyelonephritis      Plan:  Continue broad-spectrum antibiotics.  Temperature curve improved.  No indication for urologic intervention    Pee Martinez MD

## 2024-02-26 LAB
ANION GAP SERPL CALC-SCNC: 7 MEQ/L
BACTERIA BLD CULT: ABNORMAL
BACTERIA BLD CULT: ABNORMAL
BASOPHILS # BLD AUTO: 0.06 X10(3)/MCL
BASOPHILS NFR BLD AUTO: 0.5 %
BUN SERPL-MCNC: 12.2 MG/DL (ref 7–18.7)
CALCIUM SERPL-MCNC: 8.4 MG/DL (ref 8.4–10.2)
CHLORIDE SERPL-SCNC: 106 MMOL/L (ref 98–107)
CO2 SERPL-SCNC: 24 MMOL/L (ref 22–29)
CREAT SERPL-MCNC: 0.72 MG/DL (ref 0.55–1.02)
CREAT/UREA NIT SERPL: 17
EOSINOPHIL # BLD AUTO: 0.04 X10(3)/MCL (ref 0–0.9)
EOSINOPHIL NFR BLD AUTO: 0.4 %
ERYTHROCYTE [DISTWIDTH] IN BLOOD BY AUTOMATED COUNT: 13.7 % (ref 11.5–17)
GFR SERPLBLD CREATININE-BSD FMLA CKD-EPI: >60 MLS/MIN/1.73/M2
GLUCOSE SERPL-MCNC: 101 MG/DL (ref 74–100)
GRAM STN SPEC: ABNORMAL
HCT VFR BLD AUTO: 29.2 % (ref 37–47)
HGB BLD-MCNC: 9.7 G/DL (ref 12–16)
IMM GRANULOCYTES # BLD AUTO: 0.12 X10(3)/MCL (ref 0–0.04)
IMM GRANULOCYTES NFR BLD AUTO: 1.1 %
LYMPHOCYTES # BLD AUTO: 1.81 X10(3)/MCL (ref 0.6–4.6)
LYMPHOCYTES NFR BLD AUTO: 15.9 %
MCH RBC QN AUTO: 29 PG (ref 27–31)
MCHC RBC AUTO-ENTMCNC: 33.2 G/DL (ref 33–36)
MCV RBC AUTO: 87.2 FL (ref 80–94)
MONOCYTES # BLD AUTO: 1.79 X10(3)/MCL (ref 0.1–1.3)
MONOCYTES NFR BLD AUTO: 15.7 %
NEUTROPHILS # BLD AUTO: 7.59 X10(3)/MCL (ref 2.1–9.2)
NEUTROPHILS NFR BLD AUTO: 66.4 %
NRBC BLD AUTO-RTO: 0 %
PLATELET # BLD AUTO: 174 X10(3)/MCL (ref 130–400)
PMV BLD AUTO: 12.6 FL (ref 7.4–10.4)
POTASSIUM SERPL-SCNC: 3.7 MMOL/L (ref 3.5–5.1)
RBC # BLD AUTO: 3.35 X10(6)/MCL (ref 4.2–5.4)
SODIUM SERPL-SCNC: 137 MMOL/L (ref 136–145)
WBC # SPEC AUTO: 11.41 X10(3)/MCL (ref 4.5–11.5)

## 2024-02-26 PROCEDURE — 80048 BASIC METABOLIC PNL TOTAL CA: CPT | Performed by: INTERNAL MEDICINE

## 2024-02-26 PROCEDURE — 25000003 PHARM REV CODE 250: Performed by: INTERNAL MEDICINE

## 2024-02-26 PROCEDURE — 63600175 PHARM REV CODE 636 W HCPCS: Performed by: NURSE PRACTITIONER

## 2024-02-26 PROCEDURE — 63600175 PHARM REV CODE 636 W HCPCS: Performed by: INTERNAL MEDICINE

## 2024-02-26 PROCEDURE — 85025 COMPLETE CBC W/AUTO DIFF WBC: CPT | Performed by: INTERNAL MEDICINE

## 2024-02-26 PROCEDURE — 21400001 HC TELEMETRY ROOM

## 2024-02-26 RX ORDER — LISINOPRIL 20 MG/1
20 TABLET ORAL ONCE
Status: COMPLETED | OUTPATIENT
Start: 2024-02-26 | End: 2024-02-26

## 2024-02-26 RX ORDER — AMLODIPINE BESYLATE 5 MG/1
10 TABLET ORAL DAILY
Status: DISCONTINUED | OUTPATIENT
Start: 2024-02-26 | End: 2024-02-28 | Stop reason: HOSPADM

## 2024-02-26 RX ORDER — LISINOPRIL 20 MG/1
40 TABLET ORAL DAILY
Status: DISCONTINUED | OUTPATIENT
Start: 2024-02-27 | End: 2024-02-28 | Stop reason: HOSPADM

## 2024-02-26 RX ADMIN — AMLODIPINE BESYLATE 10 MG: 5 TABLET ORAL at 11:02

## 2024-02-26 RX ADMIN — CEFEPIME 1 G: 1 INJECTION, POWDER, FOR SOLUTION INTRAMUSCULAR; INTRAVENOUS at 06:02

## 2024-02-26 RX ADMIN — CEFEPIME 1 G: 1 INJECTION, POWDER, FOR SOLUTION INTRAMUSCULAR; INTRAVENOUS at 03:02

## 2024-02-26 RX ADMIN — SODIUM CHLORIDE, POTASSIUM CHLORIDE, SODIUM LACTATE AND CALCIUM CHLORIDE: 600; 310; 30; 20 INJECTION, SOLUTION INTRAVENOUS at 10:02

## 2024-02-26 RX ADMIN — LISINOPRIL 20 MG: 20 TABLET ORAL at 08:02

## 2024-02-26 RX ADMIN — SODIUM CHLORIDE, POTASSIUM CHLORIDE, SODIUM LACTATE AND CALCIUM CHLORIDE: 600; 310; 30; 20 INJECTION, SOLUTION INTRAVENOUS at 01:02

## 2024-02-26 RX ADMIN — MUPIROCIN: 20 OINTMENT TOPICAL at 08:02

## 2024-02-26 RX ADMIN — SERTRALINE HYDROCHLORIDE 100 MG: 50 TABLET ORAL at 08:02

## 2024-02-26 RX ADMIN — BUPROPION HYDROCHLORIDE 150 MG: 150 TABLET, FILM COATED, EXTENDED RELEASE ORAL at 08:02

## 2024-02-26 RX ADMIN — ENOXAPARIN SODIUM 40 MG: 40 INJECTION SUBCUTANEOUS at 05:02

## 2024-02-26 RX ADMIN — PRAMIPEXOLE DIHYDROCHLORIDE 0.5 MG: 0.25 TABLET ORAL at 08:02

## 2024-02-26 RX ADMIN — LISINOPRIL 20 MG: 20 TABLET ORAL at 05:02

## 2024-02-26 RX ADMIN — ONDANSETRON 4 MG: 2 INJECTION INTRAMUSCULAR; INTRAVENOUS at 09:02

## 2024-02-26 RX ADMIN — MUPIROCIN: 20 OINTMENT TOPICAL at 09:02

## 2024-02-26 RX ADMIN — CEFEPIME 1 G: 1 INJECTION, POWDER, FOR SOLUTION INTRAMUSCULAR; INTRAVENOUS at 11:02

## 2024-02-26 RX ADMIN — BUTALBITAL, ACETAMINOPHEN, AND CAFFEINE 1 TABLET: 50; 325; 40 TABLET ORAL at 08:02

## 2024-02-26 NOTE — PROGRESS NOTES
UROLOGY  PROGRESS  NOTE    Hair Gil 1980  [id number]  2/26/2024    Patient resting in bed  Urinating without issues  Reports some decreased appetite and abdominal cramping  No flank pain    Max temp 100.4°   BP stable   Multiple voids overnight, urine not measured   No labs this morning    BC and UC with e coli    Intake/Output:  No intake/output data recorded.  I/O last 3 completed shifts:  In: 690.5 [I.V.:493.8; IV Piggyback:196.7]  Out: -      Exam:    NAD  Card: RRR  Resp: unlabored  Abd: soft, ND  : No CVA tenderness. No urine available for assessment  Extremity: no C/C/E    Recent Results (from the past 24 hour(s))   POCT glucose    Collection Time: 02/25/24 11:36 AM   Result Value Ref Range    POCT Glucose 90 70 - 110 mg/dL            Blood Culture #2 **CANNOT BE ORDERED STAT** [[id number]] (Abnormal)  Collected: 02/23/24 1636   Order Status: Completed Specimen: Blood from Forearm, Right Updated: 02/26/24 0700    CULTURE, BLOOD ([facility]) Escherichia coli Abnormal     GRAM STAIN Gram Negative Rods Panic      Seen in gram stain of broth only Panic      2 of 2 bottles positive Panic    Susceptibility     Escherichia coli     Not Specified     Amox/K Clav <=2 Sensitive     Ampicillin <=2 Sensitive     Cefepime <=0.12 Sensitive     Ceftriaxone <=0.25 Sensitive     Cefuroxime 2 Sensitive     Ciprofloxacin <=0.25 Sensitive     Gentamicin <=1 Sensitive     Levofloxacin <=0.12 Sensitive     Meropenem <=0.25 Sensitive     Piperacillin/Tazobactam <=4 Sensitive     Tobramycin <=1 Sensitive                            Urine culture [[id number]] (Abnormal)  Collected: 02/23/24 1527   Order Status: Completed Specimen: Urine Updated: 02/25/24 0916    Urine Culture >/= 100,000 colonies/ml Escherichia coli Abnormal    Susceptibility     Escherichia coli     Not Specified     Amox/K Clav <=2 Sensitive     Ampicillin <=2 Sensitive     Cefepime <=0.12 Sensitive     Ceftriaxone <=0.25 Sensitive     Cefuroxime 4 Sensitive      Ciprofloxacin <=0.25 Sensitive     Gentamicin <=1 Sensitive     Levofloxacin <=0.12 Sensitive     Meropenem <=0.25 Sensitive     Nitrofurantoin <=16 Sensitive     Piperacillin/Tazobactam <=4 Sensitive     Tetracycline <=1 Sensitive     Tobramycin <=1 Sensitive     Trimethoprim/Sulfamethoxazole >=320 Resistant                       Assessment:  -sepsis, right pyelonephritis without hydronephrosis/stone - BC/UC with e coli    Plan:  -Continue culture specific antibiotics.  -clinically improved and stable. No indication for urologic intervention at this time.   -If clinically worsens despite antibiotics, recommend repeat imaging to r/o developing abscess.  -Urology is signing off. Please call as needed with any issues.     Shara Hawkins NP

## 2024-02-26 NOTE — PROGRESS NOTES
[facility]  Hospital Medicine Progress Note        Chief Complaint: Inpatient Follow-up     HPI:    44 y.o. female with a PMHx of HTN, anxiety/depression who presented to [facility] orthopedic ED on 2/23/2024 with c/o right flank pain x2 days.  Pain described as sharp and radiates to her back with associated dysuria, fever, chills.  Denied any nausea or vomiting.  Labs notable for WBC 26.7, sodium 132, glucose 112.  Lactic acid normal.  HCG negative.  Influenza, RSV, COVID-19 negative.  Urinalysis with 100 protein, 100 glucose, trace ketones, small blood, positive nitrites, small leukocyte esterase, 21-50 WBCs, few bacteria, moderate squamous epithelial cells and trace mucus.  Urine chlamydia and gonorrhea PCR is negative.  Blood cultures x2 obtained and urine culture pending.  CT abdomen and pelvis with IV contrast consistent with lobar nephronia/pyelonephritis in the right kidney.  She was started on IV ceftriaxone.  Admitted to hospital medicine services and transferred to [facility] for further medical management.     Urine culture noted to be growing Gram-negative rods.  Blood cultures x2 growing Gram-negative rods.BC ID panel with Enterobacter/E coli,switched her IV ceftriaxone to cefepime,Fourth generation cephalosporin- expected to cover both.  Final cultures Urine cultures growing E coli, blood cultures growing E coli 2/2 bottles.    Urology team evaluated the patient, recommended to continue IV antibiotic therapy   Interval Hx:   No acute events reported overnight.   Seen and examined at bedside.  Patient's fiancee at bedside.   Patient reported she is feeling fatigued with low appetite, some abdominal discomfort mostly right-sided/flank/right low back denied any nausea, vomiting.  Discussed ongoing care , blood cultures being positive,  IV antibiotics treatment.  T-max 100.4°,  Objective/physical exam:  General: In no acute distress, afebrile  Chest: Clear to auscultation  bilaterally  Heart:  +S1, S2, no appreciable murmur  Abdomen: Soft  : Right flank/cva tenderness  MSK: Warm, no lower extremity edema  Neurologic: Alert and oriented grossly non focal  VITAL SIGNS: 24 HRS MIN & MAX LAST   Temp  Min: 98.2 °F (36.8 °C)  Max: 100.4 °F (38 °C) (!) 100.4 °F (38 °C)   BP  Min: 145/84  Max: 177/95 (!) 173/80   Pulse  Min: 68  Max: 90  79   Resp  Min: 18  Max: 18 18   SpO2  Min: 95 %  Max: 100 % 95 %     I have reviewed the following labs:  Recent Labs   Lab 02/23/24  1527 02/25/24  0456   WBC 26.70* 14.98*   RBC 4.58 3.77*   HGB 13.4 10.9*   HCT 41.0 33.5*   MCV 89.5 88.9   MCH 29.3 28.9   MCHC 32.7* 32.5*   RDW 13.3 13.7    179   MPV 11.3* 11.9*     Recent Labs   Lab 02/23/24  1527 02/25/24  0456   * 132*   K 3.5 3.4*   CO2 23 20*   BUN 11.5 11.8   CREATININE 1.01 0.67   CALCIUM 9.6 8.1*   ALBUMIN 3.8 2.6*   ALKPHOS 88 83   ALT 34 26   AST 32 19   BILITOT 0.7 0.4     Microbiology Results (last 7 days)       Procedure Component Value Units Date/Time    Blood Culture #2 **CANNOT BE ORDERED STAT** [[id number]]  (Abnormal)  (Susceptibility) Collected: 02/23/24 1636    Order Status: Completed Specimen: Blood from Forearm, Right Updated: 02/26/24 0700     CULTURE, BLOOD ([facility]) Escherichia coli     GRAM STAIN Gram Negative Rods      Seen in gram stain of broth only      2 of 2 bottles positive    Blood Culture #1 **CANNOT BE ORDERED STAT** [[id number]]  (Abnormal)  (Susceptibility) Collected: 02/23/24 1624    Order Status: Completed Specimen: Blood from Antecubital, Right Updated: 02/26/24 0659     CULTURE, BLOOD ([facility]) Escherichia coli     GRAM STAIN Gram Negative Rods      Seen in gram stain of broth only      2 of 2 bottles positive    Urine culture [[id number]]  (Abnormal)  (Susceptibility) Collected: 02/23/24 1527    Order Status: Completed Specimen: Urine Updated: 02/25/24 0916     Urine Culture >/= 100,000 colonies/ml Escherichia coli    BCID2 Panel [[id number]]  (Abnormal)  Collected: 02/23/24 1624    Order Status: Completed Specimen: Blood from Antecubital, Right Updated: 02/24/24 1016     CTX-M (ESBL ) Not Detected     IMP (Cabapenemase ) Not Detected     KPC resistance gene (Carbapenemase ) Not Detected     mcr-1 Not Detected     mecA ID N/A     Comment: Note: Antimicrobial resistance can occur via multiple mechanisms. A Not Detected result for antimicrobial resistance gene(s) does not indicate antimicrobial susceptibility. Subculturing is required for species identification and susceptibility testing of   isolates.        mecA/C and MREJ (MRSA) gene N/A     NDM (Carbapenemase ) Not Detected     OXA-48-like (Carbapenemase ) Not Detected     Cinthya/B (VRE gene) N/A     VIM (Carbapenemase ) Not Detected     Enterococcus faecalis Not Detected     Enterococcus faecium Not Detected     Listeria monocytogenes Not Detected     Staphylococcus spp. Not Detected     Staphylococcus aureus Not Detected     Staphylococcus epidermidis Not Detected     Staphylococcus lugdunensis Not Detected     Streptococcus spp. Not Detected     Streptococcus agalactiae (Group B) Not Detected     Streptococcus pneumoniae Not Detected     Streptococcus pyogenes (Group A) Not Detected     Acinetobacter calcoaceticus/baumannii complex Not Detected     Bacteroides fragilis Not Detected     Enterobacterales Detected     Enterobacter cloacae complex Not Detected     Escherichia coli Detected     Klebsiella aerogenes Not Detected     Klebsiella oxytoca Not Detected     Klebsiella pneumoniae group Not Detected     Proteus spp. Not Detected     Salmonella spp. Not Detected     Serratia marcescens Not Detected     Haemophilus influenzae Not Detected     Neisseria meningitidis Not Detected     Pseudomonas aeruginosa Not Detected     Stenotrophomonas maltophilia Not Detected     Candida albicans Not Detected     Candida auris Not Detected     Candida glabrata Not Detected      Anncy krusei Not Detected     Candida parapsilosis Not Detected     Candida tropicalis Not Detected     Cryptococcus neoformans/gattii Not Detected    Narrative:      The TE2 BCID2 Panel is a multiplexed nucleic acid test intended for the use with [employer]® 2.0 or [employer]® Occipital Systems for the simultaneous qualitative detection and identification of multiple bacterial and yeast nucleic acids and select genetic determinants associated with antimicrobial resistance.  The TE2 BCID2 Panel test is performed directly on blood culture samples identified as positive by a continuous monitoring blood culture system.  Results are intended to be interpreted in conjunction with Gram stain results.    Chlamydia/GC, PCR [[id number]] Collected: 02/23/24 1527    Order Status: Completed Specimen: Urine Updated: 02/23/24 2030     Chlamydia trachomatis PCR Not Detected     N. gonorrhea PCR Not Detected     Source Urine    Narrative:      The Xpert CT/NG test, performed on the GeneXpert system is a qualitative in vitro real-time polymerase chain reaction (PCR) test for the automated detected and differentiation for genomic DNA from Chlamydia trachomatis (CT) and/or Neisseria gonorrhoeae (NG).             See below for Radiology    Scheduled Med:   buPROPion  150 mg Oral Daily    ceFEPime IV (PEDS and ADULTS)  1 g Intravenous Q8H    enoxparin  40 mg Subcutaneous Q24H (prophylaxis, 1700)    lisinopriL  20 mg Oral Daily    mupirocin   Nasal BID    pramipexole  0.5 mg Oral QHS    sertraline  100 mg Oral Daily      Continuous Infusions:   lactated ringers 100 mL/hr at 02/26/24 0105      PRN Meds:  acetaminophen, butalbital-acetaminophen-caffeine -40 mg, ketorolac, ondansetron     Assessment/Plan:  Acute bacterial UTI with right-sided pyelonephritis/lobar nephronia  Sepsis secondary to above  High-grade bacteremia-GNR secondary to above  Mild hyponatremia  Acute on chronic head ache  History of substance  use-amphetamine use   History of anxiety/depression  History of essential HTN with occasional acceleration    Patient hemodynamically stable, with blood pressures elevated.  T-max 100.4°   Labs from this morning noted WBC improved 11.41 K hemoglobin 9.7 suspecting drop in hemoglobin  secondary to IV fluids K 3.7, glucose 101, BUN 12.2, creatinine 0.72   Micro reviewed, urine cultures growing greater than 100,000 colonies E coli, blood cultures 2/2 bottles E coli  Continue IV cefepime , E coli in urine, blood sensitive to all except Bactrim  Continue IV hydration   Monitor fever curve going to may need to repeat CT abdomen if patient continues to exhibit fevers to rule out any abscess  Continue supportive care   Urology following, follow recommendations   Amlodipine 10 initiated for high blood pressure, also increased her home medication lisinopril from 20-40 mg monitor her blood pressure and use IV parents and adjust antihypertensives  Labs tomorrow    VTE prophylaxis:  Lovenox    Patient condition:  Fair    Anticipated discharge and Disposition:   TBD      Critical care time spent:  35 minutes   Critical care diagnosis:  Sepsis needing IV antibiotics  _____________________________________________________________________    Nutrition Status:    Radiology:  I have personally reviewed the following imaging and agree with the radiologist.     CT Abdomen Pelvis With IV Contrast NO Oral Contrast  Narrative: EXAMINATION:  CT ABDOMEN PELVIS WITH IV CONTRAST    CLINICAL HISTORY:  UTI, recurrent/complicated (Female);    TECHNIQUE:  Low dose axial images, sagittal and coronal reformations were obtained from the lung bases to the pubic symphysis following the IV administration of contrast. Automatic exposure control (AEC) is utilized to reduce patient radiation exposure.    COMPARISON:  None.    FINDINGS:  The lung bases are clear.    The liver appears normal.  No liver mass or lesion is seen.  Portal and hepatic veins appear  normal.    The patient is status post cholecystectomy.    The pancreas appears normal.  No pancreatic mass or lesion is seen.    The spleen shows no acute abnormality.    The adrenal glands appear normal.  No adrenal nodule is seen.    There is perinephric stranding around the right kidney and there is heterogeneous enhancement in the upper pole of the right kidney consistent with lobar nephronia/pyelonephritis.  No hydronephrosis is seen.  No hydroureter is seen.  No nephrolithiasis is seen.  No obvious ureteral stones are seen.    Urinary bladder appears grossly unremarkable.    No colitis is seen.  No diverticulitis is seen.  No obvious colonic mass or lesion is seen.    No free air is seen.  No free fluid is seen.  Impression: Findings seen consistent with lobar nephronia/pyelonephritis in the right kidney    Electronically signed by: Cortez Parker  Date:    02/23/2024  Time:    17:15      Jennie Munoz MD  Department of Hospital Medicine   [facility]   02/26/2024

## 2024-02-27 PROCEDURE — 21400001 HC TELEMETRY ROOM

## 2024-02-27 PROCEDURE — 25000003 PHARM REV CODE 250: Performed by: INTERNAL MEDICINE

## 2024-02-27 PROCEDURE — 63600175 PHARM REV CODE 636 W HCPCS: Performed by: INTERNAL MEDICINE

## 2024-02-27 RX ORDER — HYDRALAZINE HYDROCHLORIDE 20 MG/ML
10 INJECTION INTRAMUSCULAR; INTRAVENOUS EVERY 8 HOURS PRN
Status: DISCONTINUED | OUTPATIENT
Start: 2024-02-27 | End: 2024-02-28 | Stop reason: HOSPADM

## 2024-02-27 RX ADMIN — MUPIROCIN: 20 OINTMENT TOPICAL at 08:02

## 2024-02-27 RX ADMIN — PRAMIPEXOLE DIHYDROCHLORIDE 0.5 MG: 0.25 TABLET ORAL at 08:02

## 2024-02-27 RX ADMIN — SERTRALINE HYDROCHLORIDE 100 MG: 50 TABLET ORAL at 09:02

## 2024-02-27 RX ADMIN — BUPROPION HYDROCHLORIDE 150 MG: 150 TABLET, FILM COATED, EXTENDED RELEASE ORAL at 09:02

## 2024-02-27 RX ADMIN — CEFEPIME 1 G: 1 INJECTION, POWDER, FOR SOLUTION INTRAMUSCULAR; INTRAVENOUS at 11:02

## 2024-02-27 RX ADMIN — ENOXAPARIN SODIUM 40 MG: 40 INJECTION SUBCUTANEOUS at 06:02

## 2024-02-27 RX ADMIN — BUTALBITAL, ACETAMINOPHEN, AND CAFFEINE 1 TABLET: 50; 325; 40 TABLET ORAL at 09:02

## 2024-02-27 RX ADMIN — AMLODIPINE BESYLATE 10 MG: 5 TABLET ORAL at 09:02

## 2024-02-27 RX ADMIN — CEFEPIME 1 G: 1 INJECTION, POWDER, FOR SOLUTION INTRAMUSCULAR; INTRAVENOUS at 02:02

## 2024-02-27 RX ADMIN — LISINOPRIL 40 MG: 20 TABLET ORAL at 09:02

## 2024-02-27 RX ADMIN — MUPIROCIN: 20 OINTMENT TOPICAL at 09:02

## 2024-02-27 RX ADMIN — CEFEPIME 1 G: 1 INJECTION, POWDER, FOR SOLUTION INTRAMUSCULAR; INTRAVENOUS at 06:02

## 2024-02-27 NOTE — PLAN OF CARE
02/27/24 1147   Discharge Assessment   Assessment Type Discharge Planning Assessment   Confirmed/corrected address, phone number and insurance Yes   Confirmed Demographics Correct on Facesheet   Source of Information patient   Communicated JIM with patient/caregiver Date not available/Unable to determine   Reason For Admission Pyelonephritis   People in Home friend(s)   Do you expect to return to your current living situation? Yes   Do you have help at home or someone to help you manage your care at home? Yes   Who are your caregiver(s) and their phone number(s)? 2 roommates   Prior to hospitilization cognitive status: Unable to Assess   Current cognitive status: Alert/Oriented   Walking or Climbing Stairs Difficulty no   Dressing/Bathing Difficulty no   Home Layout Able to live on 1st floor   Equipment Currently Used at Home none   Patient currently being followed by outpatient case management? No   Do you currently have service(s) that help you manage your care at home? No   Do you take prescription medications? Yes   Do you have prescription coverage? Yes   Coverage Medicaid   Who is going to help you get home at discharge? Friends, may need assistance   How do you get to doctors appointments? family or friend will provide   Are you on dialysis? No   Do you take coumadin? No   Discharge Plan A Home   Discharge Plan B Home   DME Needed Upon Discharge  none   Discharge Plan discussed with: Patient   Transition of Care Barriers Substance Abuse   Physical Activity   On average, how many days per week do you engage in moderate to strenuous exercise (like a brisk walk)? 0 days   On average, how many minutes do you engage in exercise at this level? 0 min   Financial Resource Strain   How hard is it for you to pay for the very basics like food, housing, medical care, and heating? Very Hard   Housing Stability   In the last 12 months, was there a time when you were not able to pay the mortgage or rent on time? Y   In  the last 12 months, how many places have you lived? 2   In the last 12 months, was there a time when you did not have a steady place to sleep or slept in a shelter (including now)? N   Transportation Needs   In the past 12 months, has lack of transportation kept you from medical appointments or from getting medications? no   In the past 12 months, has lack of transportation kept you from meetings, work, or from getting things needed for daily living? No   Food Insecurity   Within the past 12 months, you worried that your food would run out before you got the money to buy more. Often true   Within the past 12 months, the food you bought just didn't last and you didn't have money to get more. Often true   Stress   Do you feel stress - tense, restless, nervous, or anxious, or unable to sleep at night because your mind is troubled all the time - these days? Rather much   Social Connections   In a typical week, how many times do you talk on the phone with family, friends, or neighbors? Three   How often do you get together with friends or relatives? Three times   How often do you attend Sabianism or Pentecostalism services? Never   Do you belong to any clubs or organizations such as Sabianism groups, unions, fraternal or athletic groups, or school groups? No   How often do you attend meetings of the clubs or organizations you belong to? Never   Are you , , , , never , or living with a partner?    Alcohol Use   Q1: How often do you have a drink containing alcohol? Never   Q2: How many drinks containing alcohol do you have on a typical day when you are drinking? None   Q3: How often do you have six or more drinks on one occasion? Never   OTHER   Name(s) of People in Home Lives with 2 roommates     Patient lives with two roommates and has some financial struggles. [place] referral sent via [employer].

## 2024-02-27 NOTE — PROGRESS NOTES
[facility]  Hospital Medicine Progress Note        Chief Complaint: Inpatient Follow-up     HPI:    44 y.o. female with a PMHx of HTN, anxiety/depression who presented to [facility] orthopedic ED on 2/23/2024 with c/o right flank pain x2 days.  Pain described as sharp and radiates to her back with associated dysuria, fever, chills.  Denied any nausea or vomiting.  Labs notable for WBC 26.7, sodium 132, glucose 112.  Lactic acid normal.  HCG negative.  Influenza, RSV, COVID-19 negative.  Urinalysis with 100 protein, 100 glucose, trace ketones, small blood, positive nitrites, small leukocyte esterase, 21-50 WBCs, few bacteria, moderate squamous epithelial cells and trace mucus.  Urine chlamydia and gonorrhea PCR is negative.  Blood cultures x2 obtained and urine culture pending.  CT abdomen and pelvis with IV contrast consistent with lobar nephronia/pyelonephritis in the right kidney.  She was started on IV ceftriaxone.  Admitted to hospital medicine services and transferred to [facility] for further medical management.     Urine culture noted to be growing Gram-negative rods.  Blood cultures x2 growing Gram-negative rods.BC ID panel with Enterobacter/E coli,switched her IV ceftriaxone to cefepime,Fourth generation cephalosporin- expected to cover both.  Final cultures Urine cultures growing E coli, blood cultures growing E coli 2/2 bottles.    Urology team evaluated the patient, recommended to continue IV antibiotic therapy   Interval Hx:   No acute events reported overnight.   Seen and examined at bedside.  She reported feeling improved, abdominal pain improved no nausea vomiting still with tiredness/fatigue    Objective/physical exam:  General: In no acute distress, afebrile  Chest: Clear to auscultation bilaterally  Heart:  +S1, S2, no appreciable murmur  Abdomen: Soft, nontender  MSK: Warm, no lower extremity edema  Neurologic: Alert and oriented grossly non focal  VITAL SIGNS: 24 HRS MIN &  MAX LAST   Temp  Min: 98 °F (36.7 °C)  Max: 99 °F (37.2 °C) 98 °F (36.7 °C)   BP  Min: 146/77  Max: 164/94 (!) 160/83   Pulse  Min: 73  Max: 101  74   Resp  Min: 20  Max: 20 20   SpO2  Min: 93 %  Max: 99 % (!) 93 %     I have reviewed the following labs:  Recent Labs   Lab 02/23/24  1527 02/25/24  0456 02/26/24  1227   WBC 26.70* 14.98* 11.41   RBC 4.58 3.77* 3.35*   HGB 13.4 10.9* 9.7*   HCT 41.0 33.5* 29.2*   MCV 89.5 88.9 87.2   MCH 29.3 28.9 29.0   MCHC 32.7* 32.5* 33.2   RDW 13.3 13.7 13.7    179 174   MPV 11.3* 11.9* 12.6*     Recent Labs   Lab 02/23/24  1527 02/25/24  0456 02/26/24  1052   * 132* 137   K 3.5 3.4* 3.7   CO2 23 20* 24   BUN 11.5 11.8 12.2   CREATININE 1.01 0.67 0.72   CALCIUM 9.6 8.1* 8.4   ALBUMIN 3.8 2.6*  --    ALKPHOS 88 83  --    ALT 34 26  --    AST 32 19  --    BILITOT 0.7 0.4  --      Microbiology Results (last 7 days)       Procedure Component Value Units Date/Time    Blood Culture #2 **CANNOT BE ORDERED STAT** [[id number]]  (Abnormal)  (Susceptibility) Collected: 02/23/24 1636    Order Status: Completed Specimen: Blood from Forearm, Right Updated: 02/26/24 0700     CULTURE, BLOOD ([facility]) Escherichia coli     GRAM STAIN Gram Negative Rods      Seen in gram stain of broth only      2 of 2 bottles positive    Blood Culture #1 **CANNOT BE ORDERED STAT** [[id number]]  (Abnormal)  (Susceptibility) Collected: 02/23/24 1624    Order Status: Completed Specimen: Blood from Antecubital, Right Updated: 02/26/24 0659     CULTURE, BLOOD ([facility]) Escherichia coli     GRAM STAIN Gram Negative Rods      Seen in gram stain of broth only      2 of 2 bottles positive    Urine culture [[id number]]  (Abnormal)  (Susceptibility) Collected: 02/23/24 1527    Order Status: Completed Specimen: Urine Updated: 02/25/24 0916     Urine Culture >/= 100,000 colonies/ml Escherichia coli    BCID2 Panel [[id number]]  (Abnormal) Collected: 02/23/24 1624    Order Status: Completed Specimen: Blood from Antecubital,  Right Updated: 02/24/24 1016     CTX-M (ESBL ) Not Detected     IMP (Cabapenemase ) Not Detected     KPC resistance gene (Carbapenemase ) Not Detected     mcr-1 Not Detected     mecA ID N/A     Comment: Note: Antimicrobial resistance can occur via multiple mechanisms. A Not Detected result for antimicrobial resistance gene(s) does not indicate antimicrobial susceptibility. Subculturing is required for species identification and susceptibility testing of   isolates.        mecA/C and MREJ (MRSA) gene N/A     NDM (Carbapenemase ) Not Detected     OXA-48-like (Carbapenemase ) Not Detected     Cinthya/B (VRE gene) N/A     VIM (Carbapenemase ) Not Detected     Enterococcus faecalis Not Detected     Enterococcus faecium Not Detected     Listeria monocytogenes Not Detected     Staphylococcus spp. Not Detected     Staphylococcus aureus Not Detected     Staphylococcus epidermidis Not Detected     Staphylococcus lugdunensis Not Detected     Streptococcus spp. Not Detected     Streptococcus agalactiae (Group B) Not Detected     Streptococcus pneumoniae Not Detected     Streptococcus pyogenes (Group A) Not Detected     Acinetobacter calcoaceticus/baumannii complex Not Detected     Bacteroides fragilis Not Detected     Enterobacterales Detected     Enterobacter cloacae complex Not Detected     Escherichia coli Detected     Klebsiella aerogenes Not Detected     Klebsiella oxytoca Not Detected     Klebsiella pneumoniae group Not Detected     Proteus spp. Not Detected     Salmonella spp. Not Detected     Serratia marcescens Not Detected     Haemophilus influenzae Not Detected     Neisseria meningitidis Not Detected     Pseudomonas aeruginosa Not Detected     Stenotrophomonas maltophilia Not Detected     Candida albicans Not Detected     Candida auris Not Detected     Candida glabrata Not Detected     Candida krusei Not Detected     Candida parapsilosis Not Detected     Candida tropicalis  Not Detected     Cryptococcus neoformans/gattii Not Detected    Narrative:      The [employer] BCID2 Panel is a multiplexed nucleic acid test intended for the use with [employer]® 2.0 or [employer]® [employer] Systems for the simultaneous qualitative detection and identification of multiple bacterial and yeast nucleic acids and select genetic determinants associated with antimicrobial resistance.  The [employer] BCID2 Panel test is performed directly on blood culture samples identified as positive by a continuous monitoring blood culture system.  Results are intended to be interpreted in conjunction with Gram stain results.    Chlamydia/GC, PCR [[id number]] Collected: 02/23/24 1527    Order Status: Completed Specimen: Urine Updated: 02/23/24 2030     Chlamydia trachomatis PCR Not Detected     N. gonorrhea PCR Not Detected     Source Urine    Narrative:      The Xpert CT/NG test, performed on the Gene[employer] Technologypert system is a qualitative in vitro real-time polymerase chain reaction (PCR) test for the automated detected and differentiation for genomic DNA from Chlamydia trachomatis (CT) and/or Neisseria gonorrhoeae (NG).             See below for Radiology    Scheduled Med:   amLODIPine  10 mg Oral Daily    buPROPion  150 mg Oral Daily    ceFEPime IV (PEDS and ADULTS)  1 g Intravenous Q8H    enoxparin  40 mg Subcutaneous Q24H (prophylaxis, 1700)    lisinopriL  40 mg Oral Daily    mupirocin   Nasal BID    pramipexole  0.5 mg Oral QHS    sertraline  100 mg Oral Daily      Continuous Infusions:   lactated ringers 100 mL/hr at 02/26/24 2240      PRN Meds:  acetaminophen, butalbital-acetaminophen-caffeine -40 mg, ondansetron     Assessment/Plan:  Acute bacterial UTI with right-sided pyelonephritis/lobar nephronia  Sepsis secondary to above  High-grade bacteremia-GNR secondary to above  Mild hyponatremia  Acute on chronic head ache  History of substance use-amphetamine use   History of anxiety/depression  History of  essential HTN with occasional acceleration    Patient hemodynamically stable, no fever spikes overnight.  Micro reviewed, urine cultures growing greater than 100,000 colonies E coli, blood cultures 2/2 bottles E coli final results  Given Gram-negative sepsis with patient complaining of fatigue we will continue monitoring her in hospital today  Continue IV cefepime , E coli in urine, blood sensitive to all except Bactrim  Continue IV hydration , can DC if p.o. intake adequate  Monitor fever curve going to may need to repeat CT abdomen if patient continues to exhibit fevers to rule out any abscess  Continue supportive care   Urology signed off   Continue amlodipine 10, lisinopril 40, monitor BP use IV p.r.n.   We will check Labs tomorrow    VTE prophylaxis:  Lovenox    Patient condition:  Fair    Anticipated discharge and Disposition:   Likely tomorrow home with p.o. antibiotics if white count normalizes and remained stable      Critical care time spent:  35 minutes   Critical care diagnosis:  Sepsis needing IV antibiotics  _____________________________________________________________________    Nutrition Status:    Radiology:  I have personally reviewed the following imaging and agree with the radiologist.     CT Abdomen Pelvis With IV Contrast NO Oral Contrast  Narrative: EXAMINATION:  CT ABDOMEN PELVIS WITH IV CONTRAST    CLINICAL HISTORY:  UTI, recurrent/complicated (Female);    TECHNIQUE:  Low dose axial images, sagittal and coronal reformations were obtained from the lung bases to the pubic symphysis following the IV administration of contrast. Automatic exposure control (AEC) is utilized to reduce patient radiation exposure.    COMPARISON:  None.    FINDINGS:  The lung bases are clear.    The liver appears normal.  No liver mass or lesion is seen.  Portal and hepatic veins appear normal.    The patient is status post cholecystectomy.    The pancreas appears normal.  No pancreatic mass or lesion is seen.    The  spleen shows no acute abnormality.    The adrenal glands appear normal.  No adrenal nodule is seen.    There is perinephric stranding around the right kidney and there is heterogeneous enhancement in the upper pole of the right kidney consistent with lobar nephronia/pyelonephritis.  No hydronephrosis is seen.  No hydroureter is seen.  No nephrolithiasis is seen.  No obvious ureteral stones are seen.    Urinary bladder appears grossly unremarkable.    No colitis is seen.  No diverticulitis is seen.  No obvious colonic mass or lesion is seen.    No free air is seen.  No free fluid is seen.  Impression: Findings seen consistent with lobar nephronia/pyelonephritis in the right kidney    Electronically signed by: Cortez Parker  Date:    02/23/2024  Time:    17:15      Jennie Munoz MD  Department of Hospital Medicine   [facility]   02/27/2024

## 2024-02-28 VITALS
TEMPERATURE: 98 F | HEART RATE: 97 BPM | WEIGHT: 215 LBS | DIASTOLIC BLOOD PRESSURE: 72 MMHG | SYSTOLIC BLOOD PRESSURE: 158 MMHG | RESPIRATION RATE: 16 BRPM | HEIGHT: 71 IN | BODY MASS INDEX: 30.1 KG/M2 | OXYGEN SATURATION: 98 %

## 2024-02-28 LAB
ANION GAP SERPL CALC-SCNC: 7 MEQ/L
BASOPHILS # BLD AUTO: 0.09 X10(3)/MCL
BASOPHILS NFR BLD AUTO: 0.9 %
BUN SERPL-MCNC: 8.7 MG/DL (ref 7–18.7)
CALCIUM SERPL-MCNC: 8.3 MG/DL (ref 8.4–10.2)
CHLORIDE SERPL-SCNC: 107 MMOL/L (ref 98–107)
CO2 SERPL-SCNC: 29 MMOL/L (ref 22–29)
CREAT SERPL-MCNC: 0.71 MG/DL (ref 0.55–1.02)
CREAT/UREA NIT SERPL: 12
EOSINOPHIL # BLD AUTO: 0.19 X10(3)/MCL (ref 0–0.9)
EOSINOPHIL NFR BLD AUTO: 1.9 %
ERYTHROCYTE [DISTWIDTH] IN BLOOD BY AUTOMATED COUNT: 13.9 % (ref 11.5–17)
GFR SERPLBLD CREATININE-BSD FMLA CKD-EPI: >60 MLS/MIN/1.73/M2
GLUCOSE SERPL-MCNC: 90 MG/DL (ref 74–100)
HCT VFR BLD AUTO: 32.6 % (ref 37–47)
HGB BLD-MCNC: 10.3 G/DL (ref 12–16)
IMM GRANULOCYTES # BLD AUTO: 0.24 X10(3)/MCL (ref 0–0.04)
IMM GRANULOCYTES NFR BLD AUTO: 2.4 %
LYMPHOCYTES # BLD AUTO: 2.7 X10(3)/MCL (ref 0.6–4.6)
LYMPHOCYTES NFR BLD AUTO: 27.4 %
MCH RBC QN AUTO: 28.6 PG (ref 27–31)
MCHC RBC AUTO-ENTMCNC: 31.6 G/DL (ref 33–36)
MCV RBC AUTO: 90.6 FL (ref 80–94)
MONOCYTES # BLD AUTO: 1.19 X10(3)/MCL (ref 0.1–1.3)
MONOCYTES NFR BLD AUTO: 12.1 %
NEUTROPHILS # BLD AUTO: 5.44 X10(3)/MCL (ref 2.1–9.2)
NEUTROPHILS NFR BLD AUTO: 55.3 %
NRBC BLD AUTO-RTO: 0 %
PLATELET # BLD AUTO: 272 X10(3)/MCL (ref 130–400)
PMV BLD AUTO: 11.2 FL (ref 7.4–10.4)
POTASSIUM SERPL-SCNC: 3.8 MMOL/L (ref 3.5–5.1)
RBC # BLD AUTO: 3.6 X10(6)/MCL (ref 4.2–5.4)
SODIUM SERPL-SCNC: 143 MMOL/L (ref 136–145)
WBC # SPEC AUTO: 9.85 X10(3)/MCL (ref 4.5–11.5)

## 2024-02-28 PROCEDURE — 25000003 PHARM REV CODE 250: Performed by: INTERNAL MEDICINE

## 2024-02-28 PROCEDURE — 80048 BASIC METABOLIC PNL TOTAL CA: CPT | Performed by: INTERNAL MEDICINE

## 2024-02-28 PROCEDURE — 85025 COMPLETE CBC W/AUTO DIFF WBC: CPT | Performed by: INTERNAL MEDICINE

## 2024-02-28 RX ORDER — AMLODIPINE BESYLATE 10 MG/1
10 TABLET ORAL DAILY
Qty: 60 TABLET | Refills: 0 | Status: SHIPPED | OUTPATIENT
Start: 2024-02-29

## 2024-02-28 RX ORDER — CEFPODOXIME PROXETIL 200 MG/1
200 TABLET, FILM COATED ORAL EVERY 12 HOURS
Qty: 20 TABLET | Refills: 0 | Status: SHIPPED | OUTPATIENT
Start: 2024-02-28 | End: 2024-02-28

## 2024-02-28 RX ORDER — LEVOFLOXACIN 750 MG/1
750 TABLET ORAL DAILY
Qty: 10 TABLET | Refills: 0 | Status: SHIPPED | OUTPATIENT
Start: 2024-02-28

## 2024-02-28 RX ORDER — CEFPODOXIME PROXETIL 200 MG/1
200 TABLET, FILM COATED ORAL EVERY 12 HOURS
Qty: 28 TABLET | Refills: 0 | Status: SHIPPED | OUTPATIENT
Start: 2024-02-28 | End: 2024-02-28 | Stop reason: HOSPADM

## 2024-02-28 RX ADMIN — BUTALBITAL, ACETAMINOPHEN, AND CAFFEINE 1 TABLET: 50; 325; 40 TABLET ORAL at 08:02

## 2024-02-28 RX ADMIN — LISINOPRIL 40 MG: 20 TABLET ORAL at 08:02

## 2024-02-28 RX ADMIN — BUPROPION HYDROCHLORIDE 150 MG: 150 TABLET, FILM COATED, EXTENDED RELEASE ORAL at 08:02

## 2024-02-28 RX ADMIN — SERTRALINE HYDROCHLORIDE 100 MG: 50 TABLET ORAL at 08:02

## 2024-02-28 RX ADMIN — MUPIROCIN: 20 OINTMENT TOPICAL at 08:02

## 2024-02-28 RX ADMIN — AMLODIPINE BESYLATE 10 MG: 5 TABLET ORAL at 08:02

## 2024-02-28 NOTE — PLAN OF CARE
02/28/24 1010   Final Note   Assessment Type Final Discharge Note   Anticipated Discharge Disposition Home   Post-Acute Status   Discharge Delays None known at this time     Patient will dc home today, transportation provided by a friend. [place] referral was sent via [employer]  yesterday and they have been notified of patient's dc today. Asked the patient if she needed any other resources from  ie, alcohol or drug rehab programs. Informs CM she was in rehab last April and is not in need of it at this time.

## 2024-02-28 NOTE — NURSING
Discharge instructions reviewed with patient. Questions encouraged and answered. Patient verbalized understanding of all instructions.    1100- medication changes made d/t pharmacy being out and pt's insurance not covering prescribed med.    1145- patient educated on new medications and reason for medication changes. Patient verbalized understanding.    1200- patient refusing wheelchair transport for discharge.

## 2024-02-28 NOTE — DISCHARGE SUMMARY
[facility] Hospital Medicine Discharge Summary    Admit Date: 2/23/2024  Discharge Date and Time: 2/28/202410:45 AM  Admitting Physician:  Team  Discharging Physician: Jennie Munoz MD.  Primary Care Physician: Sg Melendez III, MD  Consults: Urology    Discharge Diagnoses:  Acute bacterial UTI with right-sided pyelonephritis/lobar nephronia  High-grade bacteremia-GNR E coli  secondary to above  Sepsis secondary to above  Mild hyponatremia    History of substance use-amphetamine use   History of anxiety/depression  History of essential HTN with occasional acceleration    Hospital Course:    44 y.o. female with a PMHx of HTN, anxiety/depression who presented to [facility] orthopedic ED on 2/23/2024 with c/o right flank pain x2 days.  Pain described as sharp and radiates to her back with associated dysuria, fever, chills.  Denied any nausea or vomiting.  Labs notable for WBC 26.7, sodium 132, glucose 112.  Lactic acid normal.  HCG negative.  Influenza, RSV, COVID-19 negative.  Urinalysis with 100 protein, 100 glucose, trace ketones, small blood, positive nitrites, small leukocyte esterase, 21-50 WBCs, few bacteria, moderate squamous epithelial cells and trace mucus.  Urine chlamydia and gonorrhea PCR is negative.  Blood cultures x2 obtained and urine culture pending.  CT abdomen and pelvis with IV contrast consistent with lobar nephronia/pyelonephritis in the right kidney.  She was started on IV ceftriaxone.  Admitted to hospital medicine services and transferred to [facility] for further medical management.     Urine culture noted to be growing Gram-negative rods.  Blood cultures x2 growing Gram-negative rods.BC ID panel with Enterobacter/E coli,switched her IV ceftriaxone to cefepime,Fourth generation cephalosporin- expected to cover both.  Final cultures Urine cultures growing E coli, blood cultures growing E coli 2/2 bottles.     Urology team evaluated the patient, recommended to  continue  antibiotic therapy , signed off.    Pt clinically improved with iv antibiotic therapy.    Pt was seen and examined on the day of discharge, remained hemodynamically stable, discharged to home. Antibiotic prescription given/sent. medication changes made d/t pharmacy being out and pt's insurance not covering prescribed med. Recommended to follow up with PCP, pt voiced understanding.      Vitals:  VITAL SIGNS: 24 HRS MIN & MAX LAST   Temp  Min: 98.1 °F (36.7 °C)  Max: 98.3 °F (36.8 °C) 98.2 °F (36.8 °C)   BP  Min: 155/83  Max: 170/88 (!) 168/86   Pulse  Min: 69  Max: 96  96   Resp  Min: 16  Max: 20 16   SpO2  Min: 92 %  Max: 98 % 98 %       Physical Exam:  General: In no acute distress, afebrile  Chest: Clear to auscultation bilaterally  Heart:  +S1, S2, no appreciable murmur  Abdomen: Soft, nontender  MSK: Warm, no lower extremity edema  Neurologic: Alert and oriented grossly non focal    Procedures Performed: No admission procedures for hospital encounter.     Significant Diagnostic Studies: See Full reports for all details    Recent Labs   Lab 02/25/24  0456 02/26/24  1227 02/28/24  0426   WBC 14.98* 11.41 9.85   RBC 3.77* 3.35* 3.60*   HGB 10.9* 9.7* 10.3*   HCT 33.5* 29.2* 32.6*   MCV 88.9 87.2 90.6   MCH 28.9 29.0 28.6   MCHC 32.5* 33.2 31.6*   RDW 13.7 13.7 13.9    174 272   MPV 11.9* 12.6* 11.2*       Recent Labs   Lab 02/23/24  1527 02/25/24  0456 02/26/24  1052 02/28/24  0426   * 132* 137 143   K 3.5 3.4* 3.7 3.8   CO2 23 20* 24 29   BUN 11.5 11.8 12.2 8.7   CREATININE 1.01 0.67 0.72 0.71   CALCIUM 9.6 8.1* 8.4 8.3*   ALBUMIN 3.8 2.6*  --   --    ALKPHOS 88 83  --   --    ALT 34 26  --   --    AST 32 19  --   --    BILITOT 0.7 0.4  --   --         Microbiology Results (last 7 days)       Procedure Component Value Units Date/Time    Blood Culture #2 **CANNOT BE ORDERED STAT** [[id number]]  (Abnormal)  (Susceptibility) Collected: 02/23/24 1636    Order Status: Completed Specimen: Blood  from Forearm, Right Updated: 02/26/24 0700     CULTURE, BLOOD ([facility]) Escherichia coli     GRAM STAIN Gram Negative Rods      Seen in gram stain of broth only      2 of 2 bottles positive    Blood Culture #1 **CANNOT BE ORDERED STAT** [[id number]]  (Abnormal)  (Susceptibility) Collected: 02/23/24 1624    Order Status: Completed Specimen: Blood from Antecubital, Right Updated: 02/26/24 0659     CULTURE, BLOOD ([facility]) Escherichia coli     GRAM STAIN Gram Negative Rods      Seen in gram stain of broth only      2 of 2 bottles positive    Urine culture [[id number]]  (Abnormal)  (Susceptibility) Collected: 02/23/24 1527    Order Status: Completed Specimen: Urine Updated: 02/25/24 0916     Urine Culture >/= 100,000 colonies/ml Escherichia coli    BCID2 Panel [[id number]]  (Abnormal) Collected: 02/23/24 1624    Order Status: Completed Specimen: Blood from Antecubital, Right Updated: 02/24/24 1016     CTX-M (ESBL ) Not Detected     IMP (Cabapenemase ) Not Detected     KPC resistance gene (Carbapenemase ) Not Detected     mcr-1 Not Detected     mecA ID N/A     Comment: Note: Antimicrobial resistance can occur via multiple mechanisms. A Not Detected result for antimicrobial resistance gene(s) does not indicate antimicrobial susceptibility. Subculturing is required for species identification and susceptibility testing of   isolates.        mecA/C and MREJ (MRSA) gene N/A     NDM (Carbapenemase ) Not Detected     OXA-48-like (Carbapenemase ) Not Detected     Cinthya/B (VRE gene) N/A     VIM (Carbapenemase ) Not Detected     Enterococcus faecalis Not Detected     Enterococcus faecium Not Detected     Listeria monocytogenes Not Detected     Staphylococcus spp. Not Detected     Staphylococcus aureus Not Detected     Staphylococcus epidermidis Not Detected     Staphylococcus lugdunensis Not Detected     Streptococcus spp. Not Detected     Streptococcus agalactiae (Group B) Not Detected      Streptococcus pneumoniae Not Detected     Streptococcus pyogenes (Group A) Not Detected     Acinetobacter calcoaceticus/baumannii complex Not Detected     Bacteroides fragilis Not Detected     Enterobacterales Detected     Enterobacter cloacae complex Not Detected     Escherichia coli Detected     Klebsiella aerogenes Not Detected     Klebsiella oxytoca Not Detected     Klebsiella pneumoniae group Not Detected     Proteus spp. Not Detected     Salmonella spp. Not Detected     Serratia marcescens Not Detected     Haemophilus influenzae Not Detected     Neisseria meningitidis Not Detected     Pseudomonas aeruginosa Not Detected     Stenotrophomonas maltophilia Not Detected     Candida albicans Not Detected     Candida auris Not Detected     Candida glabrata Not Detected     Candida krusei Not Detected     Candida parapsilosis Not Detected     Candida tropicalis Not Detected     Cryptococcus neoformans/gattii Not Detected    Narrative:      The [employer] BCID2 Panel is a multiplexed nucleic acid test intended for the use with [employer] or [employer] for the simultaneous qualitative detection and identification of multiple bacterial and yeast nucleic acids and select genetic determinants associated with antimicrobial resistance.  The [employer] BCID2 Panel test is performed directly on blood culture samples identified as positive by a continuous monitoring blood culture system.  Results are intended to be interpreted in conjunction with Gram stain results.    Chlamydia/GC, PCR [[id number]] Collected: 02/23/24 1527    Order Status: Completed Specimen: Urine Updated: 02/23/24 2030     Chlamydia trachomatis PCR Not Detected     N. gonorrhea PCR Not Detected     Source Urine    Narrative:      The Xpert CT/NG test, performed on the [employer] system is a qualitative in vitro real-time polymerase chain reaction (PCR) test for the automated detected and differentiation for genomic DNA from  Chlamydia trachomatis (CT) and/or Neisseria gonorrhoeae (NG).             CT Abdomen Pelvis With IV Contrast NO Oral Contrast  Narrative: EXAMINATION:  CT ABDOMEN PELVIS WITH IV CONTRAST    CLINICAL HISTORY:  UTI, recurrent/complicated (Female);    TECHNIQUE:  Low dose axial images, sagittal and coronal reformations were obtained from the lung bases to the pubic symphysis following the IV administration of contrast. Automatic exposure control (AEC) is utilized to reduce patient radiation exposure.    COMPARISON:  None.    FINDINGS:  The lung bases are clear.    The liver appears normal.  No liver mass or lesion is seen.  Portal and hepatic veins appear normal.    The patient is status post cholecystectomy.    The pancreas appears normal.  No pancreatic mass or lesion is seen.    The spleen shows no acute abnormality.    The adrenal glands appear normal.  No adrenal nodule is seen.    There is perinephric stranding around the right kidney and there is heterogeneous enhancement in the upper pole of the right kidney consistent with lobar nephronia/pyelonephritis.  No hydronephrosis is seen.  No hydroureter is seen.  No nephrolithiasis is seen.  No obvious ureteral stones are seen.    Urinary bladder appears grossly unremarkable.    No colitis is seen.  No diverticulitis is seen.  No obvious colonic mass or lesion is seen.    No free air is seen.  No free fluid is seen.  Impression: Findings seen consistent with lobar nephronia/pyelonephritis in the right kidney    Electronically signed by: Cortez Parker  Date:    02/23/2024  Time:    17:15         Medication List        START taking these medications      amLODIPine 10 MG tablet  Commonly known as: NORVASC  Take 1 tablet (10 mg total) by mouth once daily.  Start taking on: February 29, 2024     levoFLOXacin 750 MG tablet  Commonly known as: LEVAQUIN  Take 1 tablet (750 mg total) by mouth once daily.            CONTINUE taking these medications      acetaminophen 650 MG  Tbsr  Commonly known as: TYLENOL  Take 1 tablet (650 mg total) by mouth every 8 (eight) hours.     benazepril-hydrochlorthiazide 20-12.5 mg per tablet  Commonly known as: LOTENSIN HCT     buPROPion 150 MG TB24 tablet  Commonly known as: WELLBUTRIN XL     pramipexole 0.25 MG tablet  Commonly known as: MIRAPEX     sertraline 100 MG tablet  Commonly known as: ZOLOFT            STOP taking these medications      cyclobenzaprine 10 MG tablet  Commonly known as: FLEXERIL     diclofenac 50 MG EC tablet  Commonly known as: VOLTAREN     ketorolac 10 mg tablet  Commonly known as: TORADOL               Where to Get Your Medications        These medications were sent to [facility] - [place] - [address]  [address], [place] [address]      Phone: [phone]   levoFLOXacin 750 MG tablet       These medications were sent to [id number] [place], [place] - [address] [facility] [address]  [address] [place] [address]      Phone: [phone]   amLODIPine 10 MG tablet          Explained in detail to the patient about the discharge plan, medications, and follow-up visits. Pt understands and agrees with the treatment plan  Discharge Disposition: Home or Self Care   Discharged Condition: stable  Diet-   Dietary Orders (From admission, onward)       Start     Ordered    02/23/24 2046  Diet Adult Regular  Diet effective now         02/23/24 2045                   Medications Per [place] med rec  Activities as tolerated   Follow-up Information       Sg Melendez III, MD Follow up on 2/28/2024.    Specialty: Family Medicine  Why: Office will call patient with date and time of follow-up appt.  Contact information:  [address]  [address]  [facility] [address] [phone]                           For further questions contact hospitalist office    Discharge time 33 minutes    For  worsening symptoms, chest pain, shortness of breath, increased abdominal pain, high grade fever, stroke or stroke like symptoms, immediately go to the nearest Emergency Room or call 911 as soon as possible.      Jennie Tai M.D on 2/28/2024. at 10:45 AM.

## 2024-06-03 PROBLEM — N10 ACUTE PYELONEPHRITIS: Status: RESOLVED | Noted: 2024-02-23 | Resolved: 2024-06-03
